# Patient Record
Sex: FEMALE | Race: WHITE | NOT HISPANIC OR LATINO | Employment: OTHER | URBAN - NONMETROPOLITAN AREA
[De-identification: names, ages, dates, MRNs, and addresses within clinical notes are randomized per-mention and may not be internally consistent; named-entity substitution may affect disease eponyms.]

---

## 2017-06-12 ENCOUNTER — LAB (OUTPATIENT)
Dept: LAB | Facility: HOSPITAL | Age: 72
End: 2017-06-12
Attending: PSYCHIATRY & NEUROLOGY

## 2017-06-12 ENCOUNTER — OFFICE VISIT (OUTPATIENT)
Dept: NEUROLOGY | Facility: CLINIC | Age: 72
End: 2017-06-12

## 2017-06-12 VITALS
OXYGEN SATURATION: 94 % | DIASTOLIC BLOOD PRESSURE: 64 MMHG | BODY MASS INDEX: 29.72 KG/M2 | HEART RATE: 95 BPM | HEIGHT: 65 IN | SYSTOLIC BLOOD PRESSURE: 118 MMHG | WEIGHT: 178.38 LBS

## 2017-06-12 DIAGNOSIS — Z13.1 ENCOUNTER FOR SCREENING FOR DIABETES MELLITUS: ICD-10-CM

## 2017-06-12 DIAGNOSIS — E08.29 DIABETES MELLITUS DUE TO UNDERLYING CONDITION WITH OTHER KIDNEY COMPLICATION: ICD-10-CM

## 2017-06-12 DIAGNOSIS — G62.9 PERIPHERAL POLYNEUROPATHY: ICD-10-CM

## 2017-06-12 DIAGNOSIS — G62.9 PERIPHERAL POLYNEUROPATHY: Primary | ICD-10-CM

## 2017-06-12 DIAGNOSIS — E08.8 DIABETES MELLITUS DUE TO UNDERLYING CONDITION WITH COMPLICATION, UNSPECIFIED LONG TERM INSULIN USE STATUS: ICD-10-CM

## 2017-06-12 PROCEDURE — 99204 OFFICE O/P NEW MOD 45 MIN: CPT | Performed by: PSYCHIATRY & NEUROLOGY

## 2017-06-12 RX ORDER — AMITRIPTYLINE HYDROCHLORIDE 25 MG/1
1 TABLET, FILM COATED ORAL DAILY
COMMUNITY
Start: 2015-07-22 | End: 2021-07-14

## 2017-06-12 RX ORDER — AMLODIPINE BESYLATE 2.5 MG/1
1 TABLET ORAL DAILY
COMMUNITY
Start: 2017-05-26 | End: 2022-09-22

## 2017-06-12 RX ORDER — CITALOPRAM 40 MG/1
1 TABLET ORAL DAILY
COMMUNITY
Start: 2015-07-22

## 2017-06-12 RX ORDER — CLOPIDOGREL BISULFATE 75 MG/1
75 TABLET ORAL DAILY
Status: ON HOLD | COMMUNITY
End: 2022-06-27 | Stop reason: SDUPTHER

## 2017-06-12 RX ORDER — HYDROXYCHLOROQUINE SULFATE 200 MG/1
200 TABLET, FILM COATED ORAL 2 TIMES DAILY
COMMUNITY
Start: 2015-07-22

## 2017-06-12 RX ORDER — MECLIZINE HCL 12.5 MG/1
1 TABLET ORAL EVERY 8 HOURS PRN
COMMUNITY
Start: 2015-07-22 | End: 2021-07-14

## 2017-06-12 RX ORDER — FERROUS SULFATE 325(65) MG
325 TABLET ORAL
COMMUNITY

## 2017-06-12 RX ORDER — OMEPRAZOLE 20 MG/1
CAPSULE, DELAYED RELEASE ORAL DAILY
COMMUNITY
Start: 2015-07-22 | End: 2021-07-14

## 2017-06-12 RX ORDER — ATORVASTATIN CALCIUM 80 MG/1
80 TABLET, FILM COATED ORAL DAILY
COMMUNITY

## 2017-06-12 RX ORDER — TIZANIDINE 4 MG/1
TABLET ORAL
COMMUNITY
Start: 2017-06-02 | End: 2017-06-12 | Stop reason: SDUPTHER

## 2017-06-12 RX ORDER — RAMIPRIL 5 MG/1
5 CAPSULE ORAL DAILY
COMMUNITY
Start: 2017-05-26

## 2017-06-12 RX ORDER — IBUPROFEN 200 MG
200 TABLET ORAL EVERY 6 HOURS PRN
COMMUNITY
End: 2021-07-14

## 2017-06-12 RX ORDER — TIZANIDINE HYDROCHLORIDE 4 MG/1
4 CAPSULE, GELATIN COATED ORAL DAILY
COMMUNITY
Start: 2015-07-22

## 2017-06-12 NOTE — PROGRESS NOTES
[facility] NEUROLOGY [place] CONSULTATION   History of Present Illness     Date: 6/12/2017    Patient Identification  Padmini Leonard is a 72 y.o. female.    Patient information was obtained from patient.  History/Exam limitations: none.    CONSULTATION requested by: Khoa Coburn MD      Chief Complaint   Balance Issues (Pt here to establish care. Pt states sx started when she had shingles in her ears)      History of Present Illness   Patient is a delightful 72-year-old referred to the [facility] neurology [place] for evaluation of gait difficulty over the last 3 years.  Patient reported that she has multiple episodes of falling especially when she is walking on an uneven surface.  Patient deny having any vertiginous sensation.  She deny any hip weakness.  Patient does complain of numbness in both feet.  Patient denies any diabetes in her past medical history.      PMH:   Past Medical History:   Diagnosis Date   • Anxiety    • Arthritis    • Bell palsy    • Lupus    • Shingles        Past Surgical History:   Past Surgical History:   Procedure Laterality Date   • APPENDECTOMY     • LAPAROSCOPIC TUBAL LIGATION         Family Hisotry:   Family History   Problem Relation Age of Onset   • Lung cancer Father    • Liver cancer Sister        Social History:   Social History     Social History   • Marital status:      Spouse name: N/A   • Number of children: N/A   • Years of education: N/A     Occupational History   • Not on file.     Social History Main Topics   • Smoking status: Current Every Day Smoker   • Smokeless tobacco: Never Used   • Alcohol use No   • Drug use: No   • Sexual activity: Not on file     Other Topics Concern   • Not on file     Social History Narrative   • No narrative on file       Medications:   Current Outpatient Prescriptions   Medication Sig Dispense Refill   • amitriptyline (ELAVIL) 25 MG tablet Take 1 tablet by mouth Daily.     • amLODIPine (NORVASC) 2.5 MG tablet 1 tablet  Daily.     • atorvastatin (LIPITOR) 80 MG tablet Take 80 mg by mouth Daily.     • carbidopa-levodopa (SINEMET)  MG per tablet Take 1 tablet by mouth Daily.     • citalopram (CeleXA) 40 MG tablet Take 1 tablet by mouth Daily.     • clopidogrel (PLAVIX) 75 MG tablet Take 75 mg by mouth Daily.     • ferrous sulfate 325 (65 FE) MG tablet Take 325 mg by mouth Daily With Breakfast.     • hydroxychloroquine (PLAQUENIL) 200 MG tablet Take  by mouth 2 (Two) Times a Day.     • ibuprofen (ADVIL,MOTRIN) 200 MG tablet Take 200 mg by mouth Every 6 (Six) Hours As Needed for Mild Pain (1-3).     • meclizine (ANTIVERT) 12.5 MG tablet Take 1 tablet by mouth Every 8 (Eight) Hours As Needed.     • omeprazole (priLOSEC) 20 MG capsule Take  by mouth Daily.     • ramipril (ALTACE) 5 MG capsule      • TiZANidine (ZANAFLEX) 4 MG capsule Take  by mouth Daily.       No current facility-administered medications for this visit.        Allergy:   Allergies   Allergen Reactions   • Penicillins    • Sulfa Antibiotics        Review of Systems:  Review of Systems   Constitutional: Negative for chills and fever.   HENT: Negative for congestion, ear pain, hearing loss, rhinorrhea and sore throat.    Eyes: Negative for pain, discharge and redness.   Respiratory: Negative for cough, shortness of breath, wheezing and stridor.    Cardiovascular: Negative for chest pain, palpitations and leg swelling.   Gastrointestinal: Negative for abdominal pain, constipation, nausea and vomiting.   Endocrine: Negative for cold intolerance, heat intolerance and polyphagia.   Genitourinary: Negative for dysuria, flank pain, frequency and urgency.   Musculoskeletal: Positive for gait problem. Negative for joint swelling, myalgias, neck pain and neck stiffness.   Skin: Negative for pallor, rash and wound.   Allergic/Immunologic: Negative for environmental allergies.   Neurological: Positive for numbness. Negative for dizziness, tremors, seizures, syncope, facial  "asymmetry, speech difficulty, weakness, light-headedness and headaches.   Hematological: Negative for adenopathy.   Psychiatric/Behavioral: Negative for confusion and hallucinations. The patient is not nervous/anxious.        Physical Exam     Vitals:    06/12/17 1414   BP: 118/64   Pulse: 95   SpO2: 94%   Weight: 178 lb 6 oz (80.9 kg)   Height: 64.5\" (163.8 cm)     GENERAL: Patient is pleasant, cooperative, appears to be stated age.  Body habitus is endomorphic.  SKIN AND EXTREMITIES:  No skin rashes or lesions are noted.  No cyanosis, clubbing or edema of the extremities.    HEAD:  Head is normocephalic and atraumatic.    NECK: Neck are non-tender without thyromegaly or adenopathy.  Carotic upstrokes are 1+/4.  No cranial or cervical bruits.  The neck is supple with a full range of motion.   ENT: palate elevate symmetrically, no evidence of high arch palate, tongue midline erythema in posterior pharynx, Mallampati Classification Class III   CARDIOVASCULAR:  Regular rate and rhythm with normal S1 and S2 without rub or gallop.  RESPIRATORY:  Clear to auscultation without wheezes or crackle   ABDOMEN:  Soft and non-tender, positive bowel sound without hepatosplenomegaly  BACK:  Back is straight without midline defect.    PSYCH:  Higher cortical function/mental status:  The patient is alert.  She is oriented x3 to time, place and person.  Recent and the remote memory appear normal.  The patient has a good fund of knowledge.  There is no visual or auditory hallucination or suicidal or homicidal ideation.  SPEECH:There is no gross evidence of aphasia, dysarthria or agnosia.      CRANIAL NERVES:  Pupils are 4mm, equal round reactive to light, reacting briskly to 2mm without afferent pupillary defect.  Visual fields are intact to confrontation testing.  Fundoscopic examination reveals sharp disk margins with normal vasculature.  No papilledema, hemorrhages or exudates.  Extraocular movements are full and smooth with " normal pursuits and saccades.  No nystagmus noted.  The face is symmetric. palate elevate symmetrically, Tongue midline, positive gag reflex. The remainder of the cranial nerves are intact and symmetrical.    MOTOR: Strength is 5/5 throughout with normal tone and bulk with the following exceptions, 4/5 intrinsic muscles of the hands and feet.  No involuntary movements noted.    Deep Tendon Reflexes: 2+ throughout except in her left ankle itches 0 over 4   SENSATION: Stocking glove sensory deficits to temperature sensation in both feet up to her knee bilaterally   COORDINATION AND GAIT: He should ambulate with a cautious gait and was quite unsteady on her left MUSCULOSKELETAL: Range of motion normal, no clubbing, cyanosis, or edema.  No joint swelling.            Studies: I have personally reviewed the following and discussed with the patient.  Results for orders placed or performed during the hospital encounter of 06/29/15   CBC and Differential   Result Value Ref Range    WBC 6.8 4.8 - 10.8 THOUS    RBC 4.14 (L) 4.20 - 5.40 m/uL    Hemoglobin 10.1 (L) 12.0 - 16.0 g/dL    Hematocrit 36 (L) 37 - 47 %    MCV 87.9 81.0 - 99.0 fL    MCH 24.4 (L) 27.0 - 31.0 uug    MCHC 27.7 (L) 30.0 - 37.0 g/dL    RDW 19.8 (H) 11.5 - 14.5 %    Platelets 268 130 - 400 THOUS    Neutrophil Rel % 78.2 37.0 - 80.0 %    Lymphocyte Rel % 11.8 10.0 - 50.0 %    Monocyte Rel % 5.8 0.0 - 12.0 %    Eosinophil Rel % 2.6 0.0 - 7.0 %    Basophil Rel % 1.30 0.00 - 2.50 %    Immature Granulocyte Rel % 0.30 0.00 - 2.50 %    Neutrophils Absolute 5.34 2.00 - 6.90 THOUS    Lymphocytes Absolute 0.81 0.60 - 3.40 THOUS    Monocytes Absolute 0.40 0.00 - 0.90 THOUS    Eosinophils Absolute 0.18 0.00 - 0.70 THOUS    Basophils Absolute 0.09 0.00 - 0.20 THOUS    Abs Imm Gran 0.02 0.00 - 0.60 THOUS   Protime-INR   Result Value Ref Range    Patient On Coumadin? N     Protime 10.8 9.3 - 12.1 SEC    INR 1.0 0.9 - 1.1   APTT   Result Value Ref Range    Patient On  Heparin? N     PTT 29 25 - 36 SEC   Comprehensive metabolic panel   Result Value Ref Range    Sodium 139 137 - 145 mmol/L    Potassium 5.5 (H) 3.5 - 5.1 mmol/L    Chloride 109 (H) 98 - 107 mmol/L    CO2 23 (L) 26 - 30 mmol/L    Anion Gap 13 10 - 20 mmol/L    BUN 18 7 - 20 mg/dL    Creatinine 0.7 0.6 - 1.3 mg/dL    BUN/Creatinine Ratio 23.9 (H) 7.1 - 23.5    eGFR >60 mL/min    Glucose 84 74 - 98 mg/dL    Calcium 9.1 8.4 - 10.2 mg/dL    Total Bilirubin 0.7 0.2 - 1.3 mg/dL    AST (SGOT) 56 (H) 15 - 46 U/L    ALT (SGPT) 35 13 - 69 U/L    Total Protein 7.1 6.3 - 8.2 g/dL    Albumin 4.0 3.5 - 5.0 g/dL    A/G Ratio 1.3 1.0 - 2.0    Alkaline Phosphatase 93 38 - 126 U/L       Review of Imaging: I have personally reviewed the following images and discussed with the patient.  No results found.      Records Reviewed: I have personally reviewed her previous medical record.    Padmini was seen today for balance issues.    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  Have counseled patient extensively today with regard to Peripheral Neuropathy as follow  I have counseled patient extensively on peripheral neuropathy.  The prevalence of peripheral neuropathy in a general practice is 8 percent in persons 55 years and older.    Peripheral neuropathy can be caused by a variety of systemic diseases, toxic exposures, medications, infections, and hereditary disorder. The most common treatable causes are diabetes, hypothyroidism,  and nutritional deficiencies.  When a patient presents with symptoms of distal numbness, tingling and pain, or weakness, the first step is to determine whether the symptoms are the result of peripheral neuropathy or of other part of  the CNS, such as nerve roots, or nerve plexus. CNS lesions may be associated with other features, such as speech difficulty, double vision, ataxia, cranial nerve involvement, or, in cases of myelopathy, impairment of bowel and bladder functions. Deep tendon reflexes are usually brisk, and muscle tone is spastic. Lesions of the peripheral nerve roots are typically asymmetric, follow a dermatomal pattern of sensory symptoms, and may have associated neck and low back pain. Lesions of the plexus are asymmetric with sensorimotor involvement of multiple nerves in one extremity. The presence of neuropathic symptoms, decreased ankle reflexes, and decreased distal sensations, regardless of distal muscle weakness and atrophy, makes the diagnosis of peripheral neuropathy likely.   The next step is to find the etiology and exclude potentially treatable causes, such as acquired toxic, nutritional, inflammatory, or immune-mediated demyelinating disorders. The neuropathies must be further characterized by onset and chronicity of symptoms, the pattern and extent of involvement, and the type of nerve fibers involved (i.e., sensory, motor, or autonomic).   The evaluation of a patient with peripheral neuropathy starts with simple blood tests, including a complete blood count, comprehensive metabolic profile, and measurement of erythrocyte sedimentation rate and fasting blood glucose, vitamin B12, and thyroid-stimulating hormone levels. Electrodiagnostic studies are recommended if the diagnosis remains unclear after initial diagnostic testing and a careful history and physical examination.  Treatment of peripheral neuropathy has two goals:  first, is to eliminate the offending agents, such as toxins or  medications; correcting a nutritional deficiency; or treating the underlying disease (e.g., corticosteroid therapy for immune-mediated neuropathy). These steps are important to halt the progression of neuropathy, and they may improve symptoms. Second, is to help patients control troublesome symptoms of peripheral neuropathy, such as severe numbness and pain, as well as to alleviate disability resulting from weakness. Several pharmacologic options exist to treat neuropathic pain, including gabapentin [Neurontin], topiramate [Topamax], carbamazepine [Tegretol], pregabalin [Lyrica]) and antidepressants (e.g., amitriptyline). Topical patches and sprays containing lidocaine (Lidoderm) or capsaicin (Zostrix) also may relieve pain in some patients.Other supportive measures, such as foot care, weight reduction, and shoe selection, may also be helpful.   2.  We'll check a baseline blood work for peripheral neuropathy workup  3.  Advised patient to ambulate with a hand-held can to ensure safety    This Document is signed by Gabriel Gibbs MD, FAAN, FAASM June 12, 20178:51 PM

## 2017-06-14 ENCOUNTER — LAB (OUTPATIENT)
Dept: LAB | Facility: HOSPITAL | Age: 72
End: 2017-06-14
Attending: PSYCHIATRY & NEUROLOGY

## 2017-06-14 DIAGNOSIS — Z13.1 ENCOUNTER FOR SCREENING FOR DIABETES MELLITUS: ICD-10-CM

## 2017-06-14 DIAGNOSIS — G62.9 PERIPHERAL POLYNEUROPATHY: ICD-10-CM

## 2017-06-14 LAB
FOLATE SERPL-MCNC: 12.9 NG/ML
GLUCOSE 1H P 100 G GLC PO SERPL-MCNC: 110 MG/DL (ref 74–110)
GLUCOSE 2H P 100 G GLC PO SERPL-MCNC: 77 MG/DL
GLUCOSE 3H P 100 G GLC PO SERPL-MCNC: 97 MG/DL
GLUCOSE P FAST SERPL-MCNC: 90 MG/DL (ref 74–98)
TSH SERPL DL<=0.05 MIU/L-ACNC: 6.09 MIU/ML (ref 0.47–4.68)
VIT B12 BLD-MCNC: 647 PG/ML (ref 239–931)

## 2017-06-14 PROCEDURE — 84165 PROTEIN E-PHORESIS SERUM: CPT | Performed by: PSYCHIATRY & NEUROLOGY

## 2017-06-14 PROCEDURE — 82951 GLUCOSE TOLERANCE TEST (GTT): CPT | Performed by: PSYCHIATRY & NEUROLOGY

## 2017-06-14 PROCEDURE — 84155 ASSAY OF PROTEIN SERUM: CPT | Performed by: PSYCHIATRY & NEUROLOGY

## 2017-06-14 PROCEDURE — 86235 NUCLEAR ANTIGEN ANTIBODY: CPT | Performed by: PSYCHIATRY & NEUROLOGY

## 2017-06-14 PROCEDURE — 36415 COLL VENOUS BLD VENIPUNCTURE: CPT

## 2017-06-14 PROCEDURE — 82607 VITAMIN B-12: CPT | Performed by: PSYCHIATRY & NEUROLOGY

## 2017-06-14 PROCEDURE — 82746 ASSAY OF FOLIC ACID SERUM: CPT | Performed by: PSYCHIATRY & NEUROLOGY

## 2017-06-14 PROCEDURE — 86334 IMMUNOFIX E-PHORESIS SERUM: CPT | Performed by: PSYCHIATRY & NEUROLOGY

## 2017-06-14 PROCEDURE — 84443 ASSAY THYROID STIM HORMONE: CPT | Performed by: PSYCHIATRY & NEUROLOGY

## 2017-06-14 PROCEDURE — 82952 GTT-ADDED SAMPLES: CPT | Performed by: PSYCHIATRY & NEUROLOGY

## 2017-06-15 LAB
ALBUMIN SERPL-MCNC: 3.5 G/DL (ref 2.9–4.4)
ALBUMIN/GLOB SERPL: 1.2 {RATIO} (ref 0.7–1.7)
ALPHA1 GLOB FLD ELPH-MCNC: 0.3 G/DL (ref 0–0.4)
ALPHA2 GLOB SERPL ELPH-MCNC: 1 G/DL (ref 0.4–1)
B-GLOBULIN SERPL ELPH-MCNC: 1 G/DL (ref 0.7–1.3)
ENA SS-A AB SER-ACNC: >8 AI (ref 0–0.9)
ENA SS-B AB SER-ACNC: 0.2 AI (ref 0–0.9)
GAMMA GLOB SERPL ELPH-MCNC: 0.7 G/DL (ref 0.4–1.8)
GLOBULIN SER CALC-MCNC: 3 G/DL (ref 2.2–3.9)
IGA SERPL-MCNC: 174 MG/DL (ref 64–422)
IGG SERPL-MCNC: 662 MG/DL (ref 700–1600)
IGM SERPL-MCNC: 31 MG/DL (ref 26–217)
INTERPRETATION SERPL IEP-IMP: ABNORMAL
Lab: ABNORMAL
M-SPIKE: ABNORMAL G/DL
PROT SERPL-MCNC: 6.5 G/DL (ref 6–8.5)

## 2017-07-31 ENCOUNTER — TRANSCRIBE ORDERS (OUTPATIENT)
Dept: MAMMOGRAPHY | Facility: HOSPITAL | Age: 72
End: 2017-07-31

## 2017-07-31 DIAGNOSIS — M81.0 OSTEOPOROSIS: ICD-10-CM

## 2017-07-31 DIAGNOSIS — Z13.9 SCREENING: Primary | ICD-10-CM

## 2017-08-17 ENCOUNTER — HOSPITAL ENCOUNTER (OUTPATIENT)
Dept: MAMMOGRAPHY | Facility: HOSPITAL | Age: 72
Discharge: HOME | End: 2017-08-17
Admitting: INTERNAL MEDICINE

## 2017-08-17 ENCOUNTER — APPOINTMENT (OUTPATIENT)
Dept: BONE DENSITY | Facility: HOSPITAL | Age: 72
End: 2017-08-17

## 2017-08-17 DIAGNOSIS — Z13.9 SCREENING: ICD-10-CM

## 2017-08-17 DIAGNOSIS — M81.0 OSTEOPOROSIS: ICD-10-CM

## 2017-08-17 PROCEDURE — 77063 BREAST TOMOSYNTHESIS BI: CPT

## 2017-08-17 PROCEDURE — G0202 SCR MAMMO BI INCL CAD: HCPCS

## 2017-08-17 PROCEDURE — 77080 DXA BONE DENSITY AXIAL: CPT

## 2017-09-08 PROBLEM — M06.9 RA (RHEUMATOID ARTHRITIS) (HCC): Status: ACTIVE | Noted: 2017-09-08

## 2017-09-08 PROBLEM — M79.7 FIBROMYALGIA: Status: ACTIVE | Noted: 2017-09-08

## 2017-09-08 PROBLEM — Z72.0 TOBACCO ABUSE: Status: ACTIVE | Noted: 2017-09-08

## 2017-09-08 PROBLEM — M32.9 SLE (SYSTEMIC LUPUS ERYTHEMATOSUS): Status: ACTIVE | Noted: 2017-09-08

## 2017-09-08 PROBLEM — F41.9 ANXIETY: Status: ACTIVE | Noted: 2017-09-08

## 2017-09-08 PROBLEM — D64.9 ANEMIA: Status: ACTIVE | Noted: 2017-09-08

## 2017-09-08 PROBLEM — K63.3 COLON ULCER: Status: ACTIVE | Noted: 2017-09-08

## 2017-09-08 PROBLEM — G25.81 RESTLESS LEGS SYNDROME (RLS): Status: ACTIVE | Noted: 2017-09-08

## 2017-09-08 PROBLEM — M19.90 OSTEOARTHRITIS: Status: ACTIVE | Noted: 2017-09-08

## 2017-09-08 PROBLEM — E05.90 HYPERTHYROIDISM: Status: ACTIVE | Noted: 2017-09-08

## 2017-09-08 PROBLEM — K31.819 GASTRIC AND DUODENAL ANGIODYSPLASIA: Status: ACTIVE | Noted: 2017-09-08

## 2017-09-08 PROBLEM — R12 HEARTBURN: Status: ACTIVE | Noted: 2017-09-08

## 2017-09-12 ENCOUNTER — OFFICE VISIT (OUTPATIENT)
Dept: NEUROLOGY | Facility: CLINIC | Age: 72
End: 2017-09-12

## 2017-09-12 VITALS
BODY MASS INDEX: 29.66 KG/M2 | OXYGEN SATURATION: 94 % | WEIGHT: 178 LBS | SYSTOLIC BLOOD PRESSURE: 128 MMHG | HEIGHT: 65 IN | HEART RATE: 91 BPM | DIASTOLIC BLOOD PRESSURE: 76 MMHG

## 2017-09-12 DIAGNOSIS — M35.00 SJOGREN'S SYNDROME (HCC): ICD-10-CM

## 2017-09-12 DIAGNOSIS — G62.9 POLYNEUROPATHY: Primary | ICD-10-CM

## 2017-09-12 PROCEDURE — 99214 OFFICE O/P EST MOD 30 MIN: CPT | Performed by: PSYCHIATRY & NEUROLOGY

## 2017-09-12 RX ORDER — LEVOTHYROXINE SODIUM 0.05 MG/1
1 TABLET ORAL DAILY
Refills: 2 | COMMUNITY
Start: 2017-06-22 | End: 2021-07-14

## 2017-09-12 NOTE — PROGRESS NOTES
[facility] NEUROLOGY [place] PROGRESS NOTE  History of Present Illness     Date: 9/12/2017    Patient Identification  Padmini Leonard is a 72 y.o. female.    Patient information was obtained from patient.  History/Exam limitations: none.    Original consultation requested by:       Chief Complaint   Peripheral Neuropathy (Pt in office for 3 month follow up ) and Balance Issues (sx have not improved )      History of Present Illness   Patient is a pleasant 72-year-old referred to [facility] neurology for evaluation of gait difficulty over the last 3 years.  She is having more difficulty with ambulation in on even surface .  Patient is extensive workup in last visit in my office.  She was found to have peripheral neuropathy which lead to frequent falling.  Her blood work is also significant for markedly elevated Sjogren antibody, namely anti RO which is > 8 and normal should be 0.  We have discussed about referring this patient to our rheumatology colleague for further evaluation.      PMH:   Past Medical History:   Diagnosis Date   • Anxiety    • Arthritis    • Backache     H/o   • Bell palsy    • H/O renal calculi    • History of blood transfusion    • Ischemic ulcer of left foot     · Ischemic left foot. 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 Unfortunately  the patient continues to smoke. Long discussion was undertaken with the patient. She had accepteda request ti give a serious consideration to quit smoking   • Lupus    • Peptic ulcer     H/o   • Shingles    • Vertigo     H/o       Past Surgical History:   Past Surgical History:   Procedure Laterality Date   • APPENDECTOMY     • COLONOSCOPY      screening   • ENDOSCOPY      with biopsy   • EYE SURGERY      Cataract   • LAPAROSCOPIC TUBAL LIGATION     • TUBAL ABDOMINAL LIGATION         Family Hisotry:   Family History   Problem Relation Age of Onset   • Lung cancer Father    • Liver cancer Sister        Social History:   Social History      Social History   • Marital status:      Spouse name: N/A   • Number of children: N/A   • Years of education: N/A     Occupational History   • Not on file.     Social History Main Topics   • Smoking status: Current Every Day Smoker   • Smokeless tobacco: Never Used   • Alcohol use No   • Drug use: No   • Sexual activity: Not on file     Other Topics Concern   • Not on file     Social History Narrative       Medications:   Current Outpatient Prescriptions   Medication Sig Dispense Refill   • amitriptyline (ELAVIL) 25 MG tablet Take 1 tablet by mouth Daily.     • amLODIPine (NORVASC) 2.5 MG tablet 1 tablet Daily.     • atorvastatin (LIPITOR) 80 MG tablet Take 80 mg by mouth Daily.     • carbidopa-levodopa (SINEMET)  MG per tablet Take 1 tablet by mouth Daily.     • citalopram (CeleXA) 40 MG tablet Take 1 tablet by mouth Daily.     • clopidogrel (PLAVIX) 75 MG tablet Take 75 mg by mouth Daily.     • ferrous sulfate 325 (65 FE) MG tablet Take 325 mg by mouth Daily With Breakfast.     • hydroxychloroquine (PLAQUENIL) 200 MG tablet Take  by mouth 2 (Two) Times a Day.     • ibuprofen (ADVIL,MOTRIN) 200 MG tablet Take 200 mg by mouth Every 6 (Six) Hours As Needed for Mild Pain (1-3).     • levothyroxine (SYNTHROID, LEVOTHROID) 50 MCG tablet 1 tablet Daily.    2   • meclizine (ANTIVERT) 12.5 MG tablet Take 1 tablet by mouth Every 8 (Eight) Hours As Needed.     • omeprazole (priLOSEC) 20 MG capsule Take  by mouth Daily.     • ramipril (ALTACE) 5 MG capsule      • TiZANidine (ZANAFLEX) 4 MG capsule Take  by mouth Daily.       No current facility-administered medications for this visit.        Allergy:   Allergies   Allergen Reactions   • Penicillins    • Sulfa Antibiotics        Review of Systems:  Review of Systems   Constitutional: Negative for chills and fever.   HENT: Negative for congestion, ear pain, hearing loss, rhinorrhea and sore throat.    Eyes: Negative for pain, discharge and redness.  "  Respiratory: Negative for cough, shortness of breath, wheezing and stridor.    Cardiovascular: Negative for chest pain, palpitations and leg swelling.   Gastrointestinal: Negative for abdominal pain, constipation, nausea and vomiting.   Endocrine: Negative for cold intolerance, heat intolerance and polyphagia.   Genitourinary: Negative for dysuria, flank pain, frequency and urgency.   Musculoskeletal: Positive for gait problem. Negative for joint swelling, myalgias, neck pain and neck stiffness.   Skin: Negative for pallor, rash and wound.   Allergic/Immunologic: Negative for environmental allergies.   Neurological: Positive for weakness and numbness. Negative for dizziness, tremors, seizures, syncope, facial asymmetry, speech difficulty, light-headedness and headaches.   Hematological: Negative for adenopathy.   Psychiatric/Behavioral: Negative for confusion and hallucinations. The patient is not nervous/anxious.        Physical Exam     Vitals:    09/12/17 1056   BP: 128/76   Pulse: 91   SpO2: 94%   Weight: 178 lb (80.7 kg)   Height: 64.5\" (163.8 cm)     GENERAL: Patient is pleasant, cooperative, appears to be stated age.  Body habitus is endomorphic.  SKIN AND EXTREMITIES:  No skin rashes or lesions are noted.  No cyanosis, clubbing or edema of the extremities.    HEAD:  Head is normocephalic and atraumatic.    NECK: Neck are non-tender without thyromegaly or adenopathy.  Carotic upstrokes are 1+/4.  No cranial or cervical bruits.  The neck is supple with a full range of motion.   ENT: palate elevate symmetrically, no evidence of high arch palate, tongue midline erythema in posterior pharynx, Mallampati Classification Class III   CARDIOVASCULAR:  Regular rate and rhythm with normal S1 and S2 without rub or gallop.  RESPIRATORY:  Clear to auscultation without wheezes or crackle   ABDOMEN:  Soft and non-tender, positive bowel sound without hepatosplenomegaly  BACK:  Back is straight without midline defect.  "   PSYCH:  Higher cortical function/mental status:  The patient is alert.  She is oriented x3 to time, place and person.  Recent and the remote memory appear normal.  The patient has a good fund of knowledge.  There is no visual or auditory hallucination or suicidal or homicidal ideation.  SPEECH:There is no gross evidence of aphasia, dysarthria or agnosia.      CRANIAL NERVES:  Pupils are 4mm, equal round reactive to light, reacting briskly to 2mm without afferent pupillary defect.  Visual fields are intact to confrontation testing.  Fundoscopic examination reveals sharp disk margins with normal vasculature.  No papilledema, hemorrhages or exudates.  Extraocular movements are full and smooth with normal pursuits and saccades.  No nystagmus noted.  The face is symmetric. palate elevate symmetrically, Tongue midline, positive gag reflex. The remainder of the cranial nerves are intact and symmetrical.    MOTOR: Strength is 5/5 throughout with normal tone and bulk with the following exceptions, 4/5 intrinsic muscles of the hands and feet.  No involuntary movements noted.    Deep Tendon Reflexes: 0 throughout.    SENSATION: Dokken glove sensory deficit   Coordination:  The patient normally performs finger-nose-finger, heel-to-knee-to-shin and rapid alternating movements in symmetrical fashion.    COORDINATION AND GAIT:  Patient is unable to perform tandem walk she ambulated with a wide-based gait   MUSCULOSKELETAL: Range of motion normal, no clubbing, cyanosis, or edema.  No joint swelling.              Records Reviewed: I have personally reviewed her previous medical record.    Padmini was seen today for peripheral neuropathy and balance issues.    Diagnoses and all orders for this visit:    Polyneuropathy  -     Ambulatory Referral to Rheumatology    Sjogren's syndrome      Treatments:    1.  Counseled patient extensively on safety in peripheral neuropathy patient  2.  We'll refer this patient to our rheumatology  colleague to further evaluate her markedly elevated Anti- RO  3.  Counseled patient on peripheral neuropathy as follow  I have counseled patient extensively on peripheral neuropathy.  The prevalence of peripheral neuropathy in a general practice is 8 percent in persons 55 years and older.    Peripheral neuropathy can be caused by a variety of systemic diseases, toxic exposures, medications, infections, and hereditary disorder. The most common treatable causes are diabetes, hypothyroidism, and nutritional deficiencies.  When a patient presents with symptoms of distal numbness, tingling and pain, or weakness, the first step is to determine whether the symptoms are the result of peripheral neuropathy or of other part of  the CNS, such as nerve roots, or nerve plexus. CNS lesions may be associated with other features, such as speech difficulty, double vision, ataxia, cranial nerve involvement, or, in cases of myelopathy, impairment of bowel and bladder functions. Deep tendon reflexes are usually brisk, and muscle tone is spastic. Lesions of the peripheral nerve roots are typically asymmetric, follow a dermatomal pattern of sensory symptoms, and may have associated neck and low back pain. Lesions of the plexus are asymmetric with sensorimotor involvement of multiple nerves in one extremity. The presence of neuropathic symptoms, decreased ankle reflexes, and decreased distal sensations, regardless of distal muscle weakness and atrophy, makes the diagnosis of peripheral neuropathy likely.   The next step is to find the etiology and exclude potentially treatable causes, such as acquired toxic, nutritional, inflammatory, or immune-mediated demyelinating disorders. The neuropathies must be further characterized by onset and chronicity of symptoms, the pattern and extent of involvement, and the type of nerve fibers involved (i.e., sensory, motor, or autonomic).   The evaluation of a patient with peripheral neuropathy starts  with simple blood tests, including a complete blood count, comprehensive metabolic profile, and measurement of erythrocyte sedimentation rate and fasting blood glucose, vitamin B12, and thyroid-stimulating hormone levels. Electrodiagnostic studies are recommended if the diagnosis remains unclear after initial diagnostic testing and a careful history and physical examination.  Treatment of peripheral neuropathy has two goals:  first, is to eliminate the offending agents, such as toxins or medications; correcting a nutritional deficiency; or treating the underlying disease (e.g., corticosteroid therapy for immune-mediated neuropathy). These steps are important to halt the progression of neuropathy, and they may improve symptoms. Second, is to help patients control troublesome symptoms of peripheral neuropathy, such as severe numbness and pain, as well as to alleviate disability resulting from weakness. Several pharmacologic options exist to treat neuropathic pain, including gabapentin [Neurontin], topiramate [Topamax], carbamazepine [Tegretol], pregabalin [Lyrica]) and antidepressants (e.g., amitriptyline). Topical patches and sprays containing lidocaine (Lidoderm) or capsaicin (Zostrix) also may relieve pain in some patients.Other supportive measures, such as foot care, weight reduction, and shoe selection, may also be helpful.  This Document is signed by Gabriel Gibbs MD, FAAN, FAASM   September 12, 20177:50 PM

## 2018-01-10 ENCOUNTER — OFFICE VISIT (OUTPATIENT)
Dept: FAMILY MEDICINE CLINIC | Facility: CLINIC | Age: 73
End: 2018-01-10

## 2018-01-10 VITALS
BODY MASS INDEX: 30.02 KG/M2 | TEMPERATURE: 99 F | OXYGEN SATURATION: 97 % | RESPIRATION RATE: 14 BRPM | HEIGHT: 64.5 IN | WEIGHT: 178 LBS | SYSTOLIC BLOOD PRESSURE: 114 MMHG | HEART RATE: 101 BPM | DIASTOLIC BLOOD PRESSURE: 60 MMHG

## 2018-01-10 DIAGNOSIS — J01.10 ACUTE NON-RECURRENT FRONTAL SINUSITIS: Primary | ICD-10-CM

## 2018-01-10 DIAGNOSIS — J20.9 ACUTE BRONCHITIS, UNSPECIFIED ORGANISM: ICD-10-CM

## 2018-01-10 PROCEDURE — 99202 OFFICE O/P NEW SF 15 MIN: CPT | Performed by: PHYSICIAN ASSISTANT

## 2018-01-10 RX ORDER — BENZONATATE 200 MG/1
200 CAPSULE ORAL 3 TIMES DAILY PRN
Qty: 20 CAPSULE | Refills: 0 | Status: SHIPPED | OUTPATIENT
Start: 2018-01-10 | End: 2018-03-26 | Stop reason: ALTCHOICE

## 2018-01-10 RX ORDER — HYDROXYCHLOROQUINE SULFATE 200 MG/1
200 TABLET, FILM COATED ORAL DAILY
COMMUNITY
End: 2018-10-03

## 2018-01-10 RX ORDER — CEFDINIR 300 MG/1
300 CAPSULE ORAL 2 TIMES DAILY
Qty: 20 CAPSULE | Refills: 0 | Status: SHIPPED | OUTPATIENT
Start: 2018-01-10 | End: 2018-01-20

## 2018-01-10 NOTE — PROGRESS NOTES
Patient presents with:  Cough: nasal congestion, ST, dry cough, slight headache x 2 week,   no fever or body aches. has COPD    HPI:   Hilda Arce is a 74year old female who presents for sinus congestion and cough for  2  weeks.  Cough started gradual CHEST: no chest pains, no palpitations, no orthopnea.   LUNGS:  See HPI  CARDIOVASCULAR: denies chest pain on exertion  GI: no nausea or abdominal pain  NEURO: +sinus headaches, no numbness/tingling to face     EXAM:   /60   Pulse 101   Temp 98.9 °F ( Patient Instructions     Please follow up with your PCP if no improvement within 5-7 days. Go directly to the ER for any acute worsening of symptoms. If you smoke, stop smoking.   Push oral fluids to help loosen up chest mucous and move it out of your bod A second infection, this time due to bacteria, may then occur. And airways irritated by allergens or smoke are more likely to get infected.        Inflamed airway: Inflammation and extra mucus narrow the airway, causing shortness of breath.       Impaired c Most cases of bronchitis are caused by cold or flu viruses. They don’t need antibiotics to treat them, even if your mucus is thick and green or yellow.  Antibiotics don’t treat viral illness and antibiotics have not been shown to have any benefit in cases o © 2774-0988 [employer] [address] [facility] [place], [facility] [place] [address] [place]. All rights reserved. This information is not intended as a substitute for professional medical care. Always follow your healthcare professional's instructions.

## 2018-01-10 NOTE — PATIENT INSTRUCTIONS
Please follow up with your PCP if no improvement within 5-7 days. Go directly to the ER for any acute worsening of symptoms. If you smoke, stop smoking. Push oral fluids to help loosen up chest mucous and move it out of your body.   Use a cold mist humid A second infection, this time due to bacteria, may then occur. And airways irritated by allergens or smoke are more likely to get infected.        Inflamed airway: Inflammation and extra mucus narrow the airway, causing shortness of breath.       Impaired c Most cases of bronchitis are caused by cold or flu viruses. They don’t need antibiotics to treat them, even if your mucus is thick and green or yellow.  Antibiotics don’t treat viral illness and antibiotics have not been shown to have any benefit in cases o © 0237-7804 [employer] [address] [facility], [facility] [place]. All rights reserved. This information is not intended as a substitute for professional medical care. Always follow your healthcare professional's instructions.

## 2018-03-26 PROBLEM — M35.01 SJOGREN'S SYNDROME WITH KERATOCONJUNCTIVITIS SICCA (HCC): Status: ACTIVE | Noted: 2018-03-26

## 2018-03-26 PROBLEM — Z87.11 HX OF GASTRIC ULCER: Status: ACTIVE | Noted: 2017-09-08

## 2018-03-26 PROBLEM — I73.9 PVD (PERIPHERAL VASCULAR DISEASE) (HCC): Status: ACTIVE | Noted: 2017-03-26

## 2018-03-26 PROBLEM — M79.7 FIBROMYALGIA: Status: ACTIVE | Noted: 2017-09-08

## 2018-03-26 PROBLEM — M19.90 OSTEOARTHROSIS: Status: ACTIVE | Noted: 2017-09-08

## 2018-03-26 PROBLEM — R12 HEARTBURN: Status: ACTIVE | Noted: 2017-09-08

## 2018-03-26 PROBLEM — K63.3 COLON ULCER: Status: ACTIVE | Noted: 2017-09-08

## 2018-03-26 PROBLEM — D64.9 ANEMIA: Status: ACTIVE | Noted: 2017-09-08

## 2018-03-26 PROBLEM — F17.219 CIGARETTE NICOTINE DEPENDENCE WITH NICOTINE-INDUCED DISORDER: Status: ACTIVE | Noted: 2017-09-08

## 2018-03-26 PROBLEM — G20 PARKINSONIAN TREMOR (HCC): Status: ACTIVE | Noted: 2018-03-26

## 2018-03-26 PROBLEM — E89.0 POSTABLATIVE HYPOTHYROIDISM: Status: ACTIVE | Noted: 2018-03-26

## 2018-03-26 PROBLEM — M06.9 RA (RHEUMATOID ARTHRITIS) (HCC): Status: ACTIVE | Noted: 2017-09-08

## 2018-03-26 PROBLEM — K31.819 GASTRIC AND DUODENAL ANGIODYSPLASIA: Status: ACTIVE | Noted: 2017-09-08

## 2018-03-26 PROBLEM — M47.812 ARTHROPATHY OF CERVICAL FACET JOINT: Status: ACTIVE | Noted: 2018-03-26

## 2018-03-26 PROBLEM — M32.9 SLE (SYSTEMIC LUPUS ERYTHEMATOSUS) (HCC): Status: ACTIVE | Noted: 2017-09-08

## 2018-03-26 PROBLEM — I73.9 PVD (PERIPHERAL VASCULAR DISEASE) (HCC): Status: ACTIVE | Noted: 2018-03-26

## 2018-03-26 PROBLEM — Z72.0 TOBACCO ABUSE: Status: ACTIVE | Noted: 2017-09-08

## 2018-03-26 PROBLEM — L93.2 CUTANEOUS LUPUS ERYTHEMATOSUS: Status: ACTIVE | Noted: 2017-09-08

## 2018-03-26 PROBLEM — M48.02 CERVICAL STENOSIS OF SPINE: Status: ACTIVE | Noted: 2018-03-26

## 2018-04-10 PROCEDURE — 82607 VITAMIN B-12: CPT | Performed by: INTERNAL MEDICINE

## 2018-04-10 PROCEDURE — 81001 URINALYSIS AUTO W/SCOPE: CPT | Performed by: INTERNAL MEDICINE

## 2018-04-10 PROCEDURE — 86141 C-REACTIVE PROTEIN HS: CPT | Performed by: INTERNAL MEDICINE

## 2018-04-11 PROBLEM — E55.9 VITAMIN D DEFICIENCY: Status: ACTIVE | Noted: 2018-04-11

## 2018-04-11 PROBLEM — N28.9 MILD RENAL INSUFFICIENCY: Status: ACTIVE | Noted: 2018-04-11

## 2018-04-11 PROBLEM — R74.8 ELEVATED ALKALINE PHOSPHATASE LEVEL: Status: ACTIVE | Noted: 2018-04-11

## 2018-04-25 PROBLEM — E11.42 TYPE 2 DIABETES MELLITUS WITH PERIPHERAL NEUROPATHY (HCC): Status: ACTIVE | Noted: 2018-04-25

## 2018-05-24 PROBLEM — I70.209 ATHEROSCLEROSIS OF ARTERIES OF EXTREMITIES (HCC): Status: ACTIVE | Noted: 2018-05-24

## 2018-05-24 PROBLEM — Z95.828 S/P INSERTION OF ILIAC ARTERY STENT: Status: ACTIVE | Noted: 2018-05-24

## 2018-10-08 PROCEDURE — 81001 URINALYSIS AUTO W/SCOPE: CPT | Performed by: INTERNAL MEDICINE

## 2018-10-22 PROCEDURE — 86618 LYME DISEASE ANTIBODY: CPT | Performed by: OTHER

## 2018-10-22 PROCEDURE — 84165 PROTEIN E-PHORESIS SERUM: CPT | Performed by: OTHER

## 2018-10-22 PROCEDURE — 84425 ASSAY OF VITAMIN B-1: CPT | Performed by: OTHER

## 2018-10-22 PROCEDURE — 83921 ORGANIC ACID SINGLE QUANT: CPT | Performed by: OTHER

## 2018-10-22 PROCEDURE — 36415 COLL VENOUS BLD VENIPUNCTURE: CPT | Performed by: OTHER

## 2018-10-22 PROCEDURE — 83883 ASSAY NEPHELOMETRY NOT SPEC: CPT | Performed by: OTHER

## 2018-10-22 PROCEDURE — 86334 IMMUNOFIX E-PHORESIS SERUM: CPT | Performed by: OTHER

## 2018-11-29 PROBLEM — M32.9 SLE (SYSTEMIC LUPUS ERYTHEMATOSUS) (HCC): Status: ACTIVE | Noted: 2017-09-08

## 2019-02-07 PROCEDURE — 82570 ASSAY OF URINE CREATININE: CPT | Performed by: INTERNAL MEDICINE

## 2019-02-07 PROCEDURE — 82043 UR ALBUMIN QUANTITATIVE: CPT | Performed by: INTERNAL MEDICINE

## 2019-02-22 PROBLEM — I70.0 THORACIC AORTA ATHEROSCLEROSIS (HCC): Status: ACTIVE | Noted: 2019-02-22

## 2019-02-22 PROBLEM — I25.10 ATHEROSCLEROSIS OF NATIVE CORONARY ARTERY OF NATIVE HEART WITHOUT ANGINA PECTORIS: Status: ACTIVE | Noted: 2019-02-22

## 2019-02-22 PROBLEM — M47.814 SPONDYLOSIS OF THORACIC REGION WITHOUT MYELOPATHY OR RADICULOPATHY: Status: ACTIVE | Noted: 2019-02-22

## 2019-02-22 PROBLEM — J47.9 BRONCHIECTASIS WITHOUT COMPLICATION (HCC): Status: ACTIVE | Noted: 2019-02-22

## 2019-05-01 PROCEDURE — 86160 COMPLEMENT ANTIGEN: CPT | Performed by: INTERNAL MEDICINE

## 2019-05-01 PROCEDURE — 81001 URINALYSIS AUTO W/SCOPE: CPT | Performed by: INTERNAL MEDICINE

## 2019-05-01 PROCEDURE — 84156 ASSAY OF PROTEIN URINE: CPT | Performed by: INTERNAL MEDICINE

## 2019-07-26 PROBLEM — N18.30 CKD (CHRONIC KIDNEY DISEASE) STAGE 3, GFR 30-59 ML/MIN (HCC): Status: ACTIVE | Noted: 2019-07-26

## 2019-07-31 PROBLEM — M47.816 FACET ARTHROPATHY, LUMBAR: Status: ACTIVE | Noted: 2019-07-31

## 2019-07-31 PROBLEM — M51.36 DEGENERATION OF LUMBAR INTERVERTEBRAL DISC: Status: ACTIVE | Noted: 2019-07-31

## 2020-09-02 PROBLEM — D84.9 IMMUNOCOMPROMISED (HCC): Status: ACTIVE | Noted: 2020-09-02

## 2020-09-02 PROBLEM — E11.59 TYPE 2 DIABETES MELLITUS WITH CIRCULATORY DISORDER, WITHOUT LONG-TERM CURRENT USE OF INSULIN (HCC): Status: ACTIVE | Noted: 2020-09-02

## 2021-01-22 PROBLEM — E11.51 TYPE 2 DIABETES MELLITUS WITH DIABETIC PERIPHERAL ANGIOPATHY WITHOUT GANGRENE, WITHOUT LONG-TERM CURRENT USE OF INSULIN (HCC): Status: ACTIVE | Noted: 2020-09-02

## 2021-01-22 PROBLEM — E11.22 TYPE 2 DIABETES MELLITUS WITH CHRONIC KIDNEY DISEASE, WITHOUT LONG-TERM CURRENT USE OF INSULIN (HCC): Status: ACTIVE | Noted: 2021-01-22

## 2021-01-23 PROBLEM — N18.31 STAGE 3A CHRONIC KIDNEY DISEASE (HCC): Status: ACTIVE | Noted: 2019-07-26

## 2021-02-06 ENCOUNTER — HOSPITAL ENCOUNTER (EMERGENCY)
Facility: HOSPITAL | Age: 76
Discharge: HOME | End: 2021-02-06
Attending: EMERGENCY MEDICINE
Payer: MEDICARE

## 2021-02-06 VITALS
SYSTOLIC BLOOD PRESSURE: 143 MMHG | TEMPERATURE: 97 F | HEART RATE: 76 BPM | RESPIRATION RATE: 20 BRPM | DIASTOLIC BLOOD PRESSURE: 60 MMHG | OXYGEN SATURATION: 93 %

## 2021-02-06 DIAGNOSIS — S39.012A STRAIN OF LUMBAR REGION, INITIAL ENCOUNTER: Primary | ICD-10-CM

## 2021-02-06 PROCEDURE — 99283 EMERGENCY DEPT VISIT LOW MDM: CPT

## 2021-02-06 RX ORDER — TRAMADOL HYDROCHLORIDE 50 MG/1
50 TABLET ORAL EVERY 6 HOURS PRN
Qty: 10 TABLET | Refills: 0 | Status: SHIPPED | OUTPATIENT
Start: 2021-02-06 | End: 2021-02-13

## 2021-02-06 RX ORDER — CYCLOBENZAPRINE HCL 10 MG
10 TABLET ORAL ONCE
Status: COMPLETED | OUTPATIENT
Start: 2021-02-06 | End: 2021-02-06

## 2021-02-06 RX ORDER — CYCLOBENZAPRINE HCL 10 MG
10 TABLET ORAL 3 TIMES DAILY PRN
Qty: 20 TABLET | Refills: 0 | Status: SHIPPED | OUTPATIENT
Start: 2021-02-06 | End: 2021-02-08

## 2021-02-06 RX ORDER — TRAMADOL HYDROCHLORIDE 50 MG/1
50 TABLET ORAL ONCE
Status: COMPLETED | OUTPATIENT
Start: 2021-02-06 | End: 2021-02-06

## 2021-02-06 RX ORDER — LIDOCAINE 50 MG/G
1 PATCH TOPICAL EVERY 24 HOURS
Qty: 15 PATCH | Refills: 0 | Status: SHIPPED | OUTPATIENT
Start: 2021-02-06

## 2021-02-06 RX ORDER — ACETAMINOPHEN 500 MG
1000 TABLET ORAL ONCE
Status: COMPLETED | OUTPATIENT
Start: 2021-02-06 | End: 2021-02-06

## 2021-02-06 RX ORDER — IBUPROFEN 600 MG/1
600 TABLET ORAL ONCE
Status: COMPLETED | OUTPATIENT
Start: 2021-02-06 | End: 2021-02-06

## 2021-02-06 NOTE — ED INITIAL ASSESSMENT (HPI)
Pt reports back pain for a couple months pt reports been taking care of 8 year old mother and moving her and injured her back, increase in pain over last couple months

## 2021-02-06 NOTE — ED PROVIDER NOTES
Patient Seen in: [facility] AND [facility] Emergency Department      History   Patient presents with:  Back Pain    Stated Complaint: right lower back pain x 3days    HPI/Subjective:   HPI    53-year-old female with history of COPD, hypertension, hyperlipidemia, abdominal pain. Genitourinary: Negative for dysuria. Musculoskeletal: Positive for back pain. Skin: Negative for rash. Neurological: Negative for dizziness. Psychiatric/Behavioral: Negative for suicidal ideas.    All other systems reviewed and are for this or any previous visit (from the past 24 hour(s)). Imaging Results Available and Reviewed by me while in ED:  No results found.       EMERGENCY DEPARTMENT COURSE AND TREATMENT:  Patient's condition was stable during Emergency Department evaluatio expresses understanding and agrees to d/c instructions    EMERGENCY DEPARTMENT MEDICAL DECISION MAKING:  After obtaining the patient's history, performing the physical exam and reviewing the diagnostics, multiple initial diagnoses were considered based on

## 2021-02-08 PROBLEM — R60.0 LOWER EXTREMITY EDEMA: Status: ACTIVE | Noted: 2021-02-08

## 2021-02-22 PROBLEM — I50.32 CHRONIC DIASTOLIC CHF (CONGESTIVE HEART FAILURE) (HCC): Status: ACTIVE | Noted: 2021-02-22

## 2021-07-14 ENCOUNTER — LAB (OUTPATIENT)
Dept: LAB | Facility: HOSPITAL | Age: 76
End: 2021-07-14

## 2021-07-14 ENCOUNTER — OFFICE VISIT (OUTPATIENT)
Dept: GASTROENTEROLOGY | Facility: CLINIC | Age: 76
End: 2021-07-14

## 2021-07-14 VITALS
HEIGHT: 65 IN | WEIGHT: 169 LBS | SYSTOLIC BLOOD PRESSURE: 102 MMHG | TEMPERATURE: 97.3 F | BODY MASS INDEX: 28.16 KG/M2 | DIASTOLIC BLOOD PRESSURE: 60 MMHG

## 2021-07-14 DIAGNOSIS — Z86.010 PERSONAL HISTORY OF COLONIC POLYPS: ICD-10-CM

## 2021-07-14 DIAGNOSIS — R13.10 DYSPHAGIA, UNSPECIFIED TYPE: ICD-10-CM

## 2021-07-14 DIAGNOSIS — D50.0 IRON DEFICIENCY ANEMIA DUE TO CHRONIC BLOOD LOSS: ICD-10-CM

## 2021-07-14 DIAGNOSIS — Z87.19 HISTORY OF COLITIS: ICD-10-CM

## 2021-07-14 DIAGNOSIS — K55.20 ANGIODYSPLASIA OF COLON: ICD-10-CM

## 2021-07-14 DIAGNOSIS — D50.0 IRON DEFICIENCY ANEMIA DUE TO CHRONIC BLOOD LOSS: Primary | ICD-10-CM

## 2021-07-14 DIAGNOSIS — Z01.818 PREOP TESTING: Primary | ICD-10-CM

## 2021-07-14 DIAGNOSIS — K21.9 GASTROESOPHAGEAL REFLUX DISEASE WITHOUT ESOPHAGITIS: ICD-10-CM

## 2021-07-14 LAB
BASOPHILS # BLD AUTO: 0.07 10*3/MM3 (ref 0–0.2)
BASOPHILS NFR BLD AUTO: 0.9 % (ref 0–1.5)
DEPRECATED RDW RBC AUTO: 57.7 FL (ref 37–54)
EOSINOPHIL # BLD AUTO: 0.15 10*3/MM3 (ref 0–0.4)
EOSINOPHIL NFR BLD AUTO: 1.9 % (ref 0.3–6.2)
ERYTHROCYTE [DISTWIDTH] IN BLOOD BY AUTOMATED COUNT: 16.7 % (ref 12.3–15.4)
HCT VFR BLD AUTO: 39.8 % (ref 34–46.6)
HGB BLD-MCNC: 12.6 G/DL (ref 12–15.9)
IMM GRANULOCYTES # BLD AUTO: 0.02 10*3/MM3 (ref 0–0.05)
IMM GRANULOCYTES NFR BLD AUTO: 0.3 % (ref 0–0.5)
LYMPHOCYTES # BLD AUTO: 0.83 10*3/MM3 (ref 0.7–3.1)
LYMPHOCYTES NFR BLD AUTO: 10.6 % (ref 19.6–45.3)
MCH RBC QN AUTO: 29.2 PG (ref 26.6–33)
MCHC RBC AUTO-ENTMCNC: 31.7 G/DL (ref 31.5–35.7)
MCV RBC AUTO: 92.3 FL (ref 79–97)
MONOCYTES # BLD AUTO: 0.56 10*3/MM3 (ref 0.1–0.9)
MONOCYTES NFR BLD AUTO: 7.2 % (ref 5–12)
NEUTROPHILS NFR BLD AUTO: 6.18 10*3/MM3 (ref 1.7–7)
NEUTROPHILS NFR BLD AUTO: 79.1 % (ref 42.7–76)
NRBC BLD AUTO-RTO: 0 /100 WBC (ref 0–0.2)
PLATELET # BLD AUTO: 247 10*3/MM3 (ref 140–450)
PMV BLD AUTO: 10.7 FL (ref 6–12)
RBC # BLD AUTO: 4.31 10*6/MM3 (ref 3.77–5.28)
WBC # BLD AUTO: 7.81 10*3/MM3 (ref 3.4–10.8)

## 2021-07-14 PROCEDURE — 36415 COLL VENOUS BLD VENIPUNCTURE: CPT

## 2021-07-14 PROCEDURE — 80053 COMPREHEN METABOLIC PANEL: CPT

## 2021-07-14 PROCEDURE — 82728 ASSAY OF FERRITIN: CPT

## 2021-07-14 PROCEDURE — 99204 OFFICE O/P NEW MOD 45 MIN: CPT | Performed by: INTERNAL MEDICINE

## 2021-07-14 PROCEDURE — 83540 ASSAY OF IRON: CPT

## 2021-07-14 PROCEDURE — 85025 COMPLETE CBC W/AUTO DIFF WBC: CPT

## 2021-07-14 PROCEDURE — 84466 ASSAY OF TRANSFERRIN: CPT

## 2021-07-14 RX ORDER — ERGOCALCIFEROL 1.25 MG/1
50000 CAPSULE ORAL
COMMUNITY
End: 2022-09-22

## 2021-07-14 RX ORDER — PRAMIPEXOLE DIHYDROCHLORIDE 0.5 MG/1
0.5 TABLET ORAL 3 TIMES DAILY
COMMUNITY
End: 2021-08-25

## 2021-07-14 RX ORDER — SODIUM CHLORIDE 9 MG/ML
70 INJECTION, SOLUTION INTRAVENOUS CONTINUOUS PRN
Status: CANCELLED | OUTPATIENT
Start: 2021-07-14

## 2021-07-14 RX ORDER — BUSPIRONE HYDROCHLORIDE 10 MG/1
10 TABLET ORAL 3 TIMES DAILY
COMMUNITY

## 2021-07-14 RX ORDER — SODIUM, POTASSIUM,MAG SULFATES 17.5-3.13G
2 SOLUTION, RECONSTITUTED, ORAL ORAL ONCE
Qty: 354 ML | Refills: 0 | Status: SHIPPED | OUTPATIENT
Start: 2021-07-14 | End: 2021-07-14

## 2021-07-14 RX ORDER — ALENDRONATE SODIUM 70 MG/1
70 TABLET ORAL
COMMUNITY

## 2021-07-14 NOTE — PROGRESS NOTES
New Patient Consult      Date: 2021   Patient Name: Padmini Leonard  MRN: [id number]  : 1945     Referring Physician: Sarah Frost DO    Chief Complaint   Patient presents with   • Anemia       History of Present Illness: Padmini Leonard is a 76 y.o. female who is here today to establish care with Gastroenterology for evaluation of anemia.  Patient has a chronic anemia at least for the past 8 years now.  Her hemoglobin as per record dropped to 6 once in .  Her hemoglobin was 9.3 in May 2021 and improved to 11.6 after iron pills.. Iron studies revealed normal iron and ferritin with iron saturation 72%.  This was done mostly with iron pills.  Her folic acid and the vitamin B12 level was normal in May 2021.  Platelets were normal liver enzymes were normal except borderline elevated alkaline phosphatase.  Patient is currently on Plavix. He has ongoing reflux symptoms and symptoms controlled with omeprazole 40mg po daily. She will get occasional rt lower abdomen.     This patient deny any change in bowel habit, hematochezia or melena.  Weight is stable. Pt denies nausea vomiting or odynophagia. She will have choking at throat with solid and liquids since about a year.  Prior history of EGD over ten yrs and last colonoscopy was in , inflammation found at [facility] , biopsy from the ascending colon path revealed chronic colitis. Pre was poor, few left colon polyps noted that was nott removed. As per record she had multiple polyps removed including advanced polyp with EMR before thant. She had rt colon AVM which was ablated. No family history of colon cancer or any GI malignancy. No history of any abdominal surgery. Denies alcohol abuse or cigarette smoking.     She has lupus/ RA on plaquanil      2015  The patient came back for follow visit today. The patient is doing reasonably well from GI point of view. She denies nausea vomiting. There is no abdominal pain. She denies reflux.  There is no dysphagia or odynophagia. There is no history of overt GI bleed (hematemesis melena or hematochezia). She denies recent weight loss. She has good appetite. There is no liver or pancreatic disease.  As you recall the patient has been hospitalized recently in June 2015 with history of left lower extremity ischemia. She was found to be significantly anemic as well. 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 Furthermore the patient underwent a colonoscopy. She was found to have some polypoid areas which were not removed in anticipation of potential vascular intervention necessitating anticoagulation. The patient was noted to have a small ulceration at the ileocecal valve biopsies of which revealed mucosal excoriation with associated chronic colitis. Currently the patient feels okay. She still has left ischemic foot. Unfortunately the patient continues to smoke. The patient is smoking perhaps about a pack a day.        Subjective      Past Medical History:   Past Medical History:   Diagnosis Date   • Anxiety    • Arthritis    • Backache     H/o   • Bell palsy    • H/O renal calculi    • History of blood transfusion    • Ischemic ulcer of left foot (CMS/HCC)     · Ischemic left foot. 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 Unfortunately  the patient continues to smoke. Long discussion was undertaken with the patient. She had accepteda request ti give a serious consideration to quit smoking   • Lupus (CMS/HCC)    • Peptic ulcer     H/o   • Shingles    • Vertigo     H/o       Past Surgical History:   Past Surgical History:   Procedure Laterality Date   • APPENDECTOMY     • COLONOSCOPY      screening   • ENDOSCOPY      with biopsy   • EYE SURGERY      Cataract   • LAPAROSCOPIC TUBAL LIGATION     • TUBAL ABDOMINAL LIGATION         Family History:   Family History   Problem Relation Age of Onset   •  Lung cancer Father    • Liver cancer Sister        Social History:   Social History     Socioeconomic History   • Marital status:      Spouse name: Not on file   • Number of children: Not on file   • Years of education: Not on file   • Highest education level: Not on file   Tobacco Use   • Smoking status: Current Every Day Smoker   • Smokeless tobacco: Never Used   Substance and Sexual Activity   • Alcohol use: No   • Drug use: No         Current Outpatient Medications:   •  alendronate (FOSAMAX) 70 MG tablet, Take 70 mg by mouth Every 7 (Seven) Days., Disp: , Rfl:   •  amLODIPine (NORVASC) 2.5 MG tablet, 1 tablet Daily., Disp: , Rfl:   •  atorvastatin (LIPITOR) 80 MG tablet, Take 80 mg by mouth Daily., Disp: , Rfl:   •  busPIRone (BUSPAR) 10 MG tablet, Take 10 mg by mouth 3 (Three) Times a Day., Disp: , Rfl:   •  carbidopa-levodopa (SINEMET)  MG per tablet, Take 1 tablet by mouth Daily., Disp: , Rfl:   •  citalopram (CeleXA) 40 MG tablet, Take 1 tablet by mouth Daily., Disp: , Rfl:   •  clopidogrel (PLAVIX) 75 MG tablet, Take 75 mg by mouth Daily., Disp: , Rfl:   •  ferrous sulfate 325 (65 FE) MG tablet, Take 325 mg by mouth Daily With Breakfast., Disp: , Rfl:   •  hydroxychloroquine (PLAQUENIL) 200 MG tablet, Take  by mouth 2 (Two) Times a Day., Disp: , Rfl:   •  pramipexole (MIRAPEX) 0.5 MG tablet, Take 0.5 mg by mouth 3 (Three) Times a Day., Disp: , Rfl:   •  ramipril (ALTACE) 5 MG capsule, , Disp: , Rfl:   •  TiZANidine (ZANAFLEX) 4 MG capsule, Take  by mouth Daily., Disp: , Rfl:   •  vitamin D (ERGOCALCIFEROL) 1.25 MG (79673 UT) capsule capsule, Take 50,000 Units by mouth Every 14 (Fourteen) Days., Disp: , Rfl:   •  sodium-potassium-magnesium sulfates (Suprep Bowel Prep Kit) 17.5-3.13-1.6 GM/177ML solution oral solution, Take 2 bottles by mouth 1 (One) Time for 1 dose. Please see prep instructions from office., Disp: 354 mL, Rfl: 0    Allergies   Allergen Reactions   • Penicillins    • Sulfa  "Antibiotics        Review of Systems:   Review of Systems   Constitutional: Negative for appetite change, fatigue, fever and unexpected weight loss.   HENT: Positive for trouble swallowing.    Respiratory: Negative for cough, shortness of breath and wheezing.    Cardiovascular: Negative for chest pain, palpitations and leg swelling.   Gastrointestinal: Positive for abdominal pain and GERD. Negative for abdominal distention, anal bleeding, blood in stool, constipation, diarrhea, nausea, rectal pain, vomiting and indigestion.   Genitourinary: Negative for dysuria, frequency and hematuria.   Musculoskeletal: Negative for back pain and joint swelling.   Skin: Negative for color change, rash and skin lesions.   Neurological: Negative for dizziness, syncope, speech difficulty, weakness, headache and memory problem.   Hematological: Negative for adenopathy. Does not bruise/bleed easily.   Psychiatric/Behavioral: Negative for agitation, behavioral problems, suicidal ideas and depressed mood.       The following portions of the patient's history were reviewed and updated as appropriate: allergies, current medications, past family history, past medical history, past social history, past surgical history and problem list.    Objective     Physical Exam:  Vital Signs:   Vitals:    07/14/21 1538   BP: 102/60   Temp: 97.3 °F (36.3 °C)   TempSrc: Temporal   Weight: 76.7 kg (169 lb)   Height: 163.8 cm (64.5\")       Physical Exam  Vitals and nursing note reviewed.   Constitutional:       Appearance: She is well-developed.   HENT:      Head: Normocephalic and atraumatic.      Right Ear: External ear normal.      Left Ear: External ear normal.   Eyes:      Comments: Pallor present   Neck:      Thyroid: No thyromegaly.      Trachea: No tracheal deviation.   Cardiovascular:      Rate and Rhythm: Normal rate and regular rhythm.      Heart sounds: No murmur heard.     Pulmonary:      Effort: Pulmonary effort is normal. No respiratory " distress.      Breath sounds: Normal breath sounds.   Abdominal:      General: Bowel sounds are normal. There is no distension.      Palpations: Abdomen is soft. There is no mass.      Tenderness: There is no abdominal tenderness.      Hernia: No hernia is present.   Musculoskeletal:         General: Normal range of motion.      Cervical back: Normal range of motion and neck supple.   Skin:     General: Skin is warm and dry.   Neurological:      General: No focal deficit present.      Mental Status: She is alert and oriented to person, place, and time.      Cranial Nerves: No cranial nerve deficit.      Sensory: No sensory deficit.   Psychiatric:         Mood and Affect: Mood normal.         Behavior: Behavior normal.         Thought Content: Thought content normal.         Judgment: Judgment normal.         Results Review:   I have reviewed the patient's new clinical and imaging results and agree with the interpretation.     No visits with results within 90 Day(s) from this visit.   Latest known visit with results is:   Abstract on 09/08/2017   Component Date Value Ref Range Status   •  Colonoscopy 06/24/2015  Right colonic vascular ectasia status post thermal ablation therapy. Proximal ascending colon superficial clean-based healing ulcerations (biopsied). Scant early diverticular changes in the left colon. Multiple colon polyps-not biopsied or removed.   Final      No radiology results for the last 90 days.    Assessment / Plan      Assessment & Plan:  1. Iron deficiency anemia due to chronic blood loss  2. Angiodysplasia of colon  3.  History of chronic colitis  Patient has intermittent anemia for the past 7 - 8 years now.  Etiology is unclear however possible GI bleed is suspected.  She did have a work-up done for the anemia in 2015.  As per record her colonoscopy done revealed ascending colon inflammation ulceration and pathology consistent with chronic colitis.  She also had a right-sided colonic AVMs which  were ablated.  Her prep was poor and few small colon polyps were not removed.  Unclear whether patient has any inflammatory bowel disease as she also has a lupus and rheumatoid arthritis.  However she does not have any current significant lower GI symptoms.    She needs an EGD and colonoscopy for further evaluation.  She did have a tortuous long colon as per record.  Patient also has some issues with the dysphagia.   We will schedule these 2 procedures separately.  We will continue her iron pills for now  CBC, CMP, iron studies now  We will also get a CT scan abdomen pelvis to rule out any solid organ pathology.  She may need small bowel PillCam study if EGD colonoscopy is normal    - CT Abdomen Pelvis With Contrast; Future  - CBC Auto Differential; Future  - Comprehensive Metabolic Panel; Future  - Iron Profile; Future  - Ferritin; Future  - Case Request; Standing  - Implement Anesthesia Orders Day of Procedure; Standing  - Obtain Informed Consent; Standing  - Oxygen Therapy- Nasal Cannula; 2 LPM; Titrate for SPO2: equal to or greater than, 90%; Standing  - sodium chloride 0.9 % infusion  - Case Request  - Case Request; Standing  - Implement Anesthesia Orders Day of Procedure; Standing  - Obtain Informed Consent; Standing  - Verify Bowel Prep Was Successful; Standing  - Oxygen Therapy- Nasal Cannula; 2 LPM; Titrate for SPO2: equal to or greater than, 90%; Standing  - sodium chloride 0.9 % infusion  - Case Request      3. Personal history of colonic polyps  Patient has a prior history of multiple colon polyps removed including the advanced polyp with a EMR technique in the past.. Last colonoscopy was in 2015 with poor prep and had a few left-sided small polyps which were not removed.  She was advised to repeat the colonoscopy in 1 year time but patient did not keep up the appointment.  She needs a surveillance colonoscopy now will schedule the same.    The indications, technique, alternatives and potential risk and  complications were discussed with the patient including but not limited to bleeding, bowel perforations, missing lesions and anesthetic complications. The patient understands and wishes to proceed with the procedure and has given their verbal consent. Written patient education information was given to the patient.   The patient will call if they have further questions before procedure.     4. Gastroesophageal reflux disease without esophagitis  5.  Dysphagia unspecified  Patient appears to have some degree for dysmotility .  I doubt she has any esophageal mechanical obstruction.    No significant reflux symptoms while on omeprazole 40 mg p.o. daily dose .  Her last EGD was over 10 years ago.  Will recommend on a PPI dosage when once the endoscopy is performed.      Follow Up:   Return for Follow Up after procedure.    Kathryn Stewart MD  [facility] [place]  7/14/2021  16:25 EDT    Please note that portions of this note may have been completed with a voice recognition program.

## 2021-07-15 LAB
ALBUMIN SERPL-MCNC: 4.1 G/DL (ref 3.5–5.2)
ALBUMIN/GLOB SERPL: 1.7 G/DL
ALP SERPL-CCNC: 127 U/L (ref 39–117)
ALT SERPL W P-5'-P-CCNC: 10 U/L (ref 1–33)
ANION GAP SERPL CALCULATED.3IONS-SCNC: 8.7 MMOL/L (ref 5–15)
AST SERPL-CCNC: 13 U/L (ref 1–32)
BILIRUB SERPL-MCNC: 0.2 MG/DL (ref 0–1.2)
BUN SERPL-MCNC: 16 MG/DL (ref 8–23)
BUN/CREAT SERPL: 18.2 (ref 7–25)
CALCIUM SPEC-SCNC: 9 MG/DL (ref 8.6–10.5)
CHLORIDE SERPL-SCNC: 103 MMOL/L (ref 98–107)
CO2 SERPL-SCNC: 27.3 MMOL/L (ref 22–29)
CREAT SERPL-MCNC: 0.88 MG/DL (ref 0.57–1)
FERRITIN SERPL-MCNC: 13.9 NG/ML (ref 13–150)
GFR SERPL CREATININE-BSD FRML MDRD: 62 ML/MIN/1.73
GLOBULIN UR ELPH-MCNC: 2.4 GM/DL
GLUCOSE SERPL-MCNC: 94 MG/DL (ref 65–99)
IRON 24H UR-MRATE: 44 MCG/DL (ref 37–145)
IRON SATN MFR SERPL: 10 % (ref 20–50)
POTASSIUM SERPL-SCNC: 4.5 MMOL/L (ref 3.5–5.2)
PROT SERPL-MCNC: 6.5 G/DL (ref 6–8.5)
SODIUM SERPL-SCNC: 139 MMOL/L (ref 136–145)
TIBC SERPL-MCNC: 434 MCG/DL (ref 298–536)
TRANSFERRIN SERPL-MCNC: 291 MG/DL (ref 200–360)

## 2021-07-27 PROBLEM — Z86.010 PERSONAL HISTORY OF COLONIC POLYPS: Status: ACTIVE | Noted: 2021-07-27

## 2021-07-27 PROBLEM — K55.20 ANGIODYSPLASIA OF COLON: Status: ACTIVE | Noted: 2021-07-27

## 2021-07-27 PROBLEM — Z86.0100 PERSONAL HISTORY OF COLONIC POLYPS: Status: ACTIVE | Noted: 2021-07-27

## 2021-07-30 ENCOUNTER — HOSPITAL ENCOUNTER (OUTPATIENT)
Dept: CT IMAGING | Facility: HOSPITAL | Age: 76
Discharge: HOME | End: 2021-07-30
Admitting: INTERNAL MEDICINE

## 2021-07-30 DIAGNOSIS — D50.0 IRON DEFICIENCY ANEMIA DUE TO CHRONIC BLOOD LOSS: ICD-10-CM

## 2021-07-30 PROCEDURE — 0 DIATRIZOATE MEGLUMINE & SODIUM PER 1 ML: Performed by: INTERNAL MEDICINE

## 2021-07-30 PROCEDURE — 25010000002 IOPAMIDOL 61 % SOLUTION: Performed by: INTERNAL MEDICINE

## 2021-07-30 PROCEDURE — 74177 CT ABD & PELVIS W/CONTRAST: CPT

## 2021-07-30 RX ORDER — IBUPROFEN 200 MG
200 TABLET ORAL EVERY 6 HOURS PRN
COMMUNITY
End: 2021-08-02 | Stop reason: HOSPADM

## 2021-07-30 RX ADMIN — DIATRIZOATE MEGLUMINE AND DIATRIZOATE SODIUM 30 ML: 660; 100 LIQUID ORAL; RECTAL at 11:30

## 2021-07-30 RX ADMIN — IOPAMIDOL 100 ML: 612 INJECTION, SOLUTION INTRAVENOUS at 13:12

## 2021-08-02 ENCOUNTER — ANESTHESIA EVENT (OUTPATIENT)
Dept: GASTROENTEROLOGY | Facility: HOSPITAL | Age: 76
End: 2021-08-02

## 2021-08-02 ENCOUNTER — HOSPITAL ENCOUNTER (OUTPATIENT)
Facility: HOSPITAL | Age: 76
Setting detail: HOSPITAL OUTPATIENT SURGERY
Discharge: HOME | End: 2021-08-02
Attending: INTERNAL MEDICINE | Admitting: INTERNAL MEDICINE

## 2021-08-02 ENCOUNTER — ANESTHESIA (OUTPATIENT)
Dept: GASTROENTEROLOGY | Facility: HOSPITAL | Age: 76
End: 2021-08-02

## 2021-08-02 VITALS
TEMPERATURE: 97.1 F | SYSTOLIC BLOOD PRESSURE: 147 MMHG | OXYGEN SATURATION: 93 % | BODY MASS INDEX: 28.32 KG/M2 | WEIGHT: 170 LBS | HEART RATE: 68 BPM | RESPIRATION RATE: 16 BRPM | HEIGHT: 65 IN | DIASTOLIC BLOOD PRESSURE: 64 MMHG

## 2021-08-02 DIAGNOSIS — K55.20 ANGIODYSPLASIA OF COLON: ICD-10-CM

## 2021-08-02 DIAGNOSIS — D50.0 IRON DEFICIENCY ANEMIA DUE TO CHRONIC BLOOD LOSS: ICD-10-CM

## 2021-08-02 DIAGNOSIS — Z86.010 PERSONAL HISTORY OF COLONIC POLYPS: ICD-10-CM

## 2021-08-02 LAB — SARS-COV-2 RNA PNL SPEC NAA+PROBE: NOT DETECTED

## 2021-08-02 PROCEDURE — 43255 EGD CONTROL BLEEDING ANY: CPT | Performed by: INTERNAL MEDICINE

## 2021-08-02 PROCEDURE — 43239 EGD BIOPSY SINGLE/MULTIPLE: CPT | Performed by: INTERNAL MEDICINE

## 2021-08-02 PROCEDURE — 88305 TISSUE EXAM BY PATHOLOGIST: CPT | Performed by: INTERNAL MEDICINE

## 2021-08-02 PROCEDURE — 25010000002 PROPOFOL 1000 MG/100ML EMULSION: Performed by: NURSE ANESTHETIST, CERTIFIED REGISTERED

## 2021-08-02 PROCEDURE — C9803 HOPD COVID-19 SPEC COLLECT: HCPCS

## 2021-08-02 PROCEDURE — 87635 SARS-COV-2 COVID-19 AMP PRB: CPT | Performed by: INTERNAL MEDICINE

## 2021-08-02 PROCEDURE — C1889 IMPLANT/INSERT DEVICE, NOC: HCPCS | Performed by: INTERNAL MEDICINE

## 2021-08-02 DEVICE — DEV CLIP ENDO RESOLUTION360 CONTRL ROT 235CM: Type: IMPLANTABLE DEVICE | Site: ESOPHAGUS | Status: FUNCTIONAL

## 2021-08-02 RX ORDER — SODIUM CHLORIDE 9 MG/ML
70 INJECTION, SOLUTION INTRAVENOUS CONTINUOUS PRN
Status: DISCONTINUED | OUTPATIENT
Start: 2021-08-02 | End: 2021-08-02 | Stop reason: HOSPADM

## 2021-08-02 RX ORDER — PROPOFOL 10 MG/ML
INJECTION, EMULSION INTRAVENOUS AS NEEDED
Status: DISCONTINUED | OUTPATIENT
Start: 2021-08-02 | End: 2021-08-02 | Stop reason: SURG

## 2021-08-02 RX ORDER — LIDOCAINE HYDROCHLORIDE 20 MG/ML
INJECTION, SOLUTION INTRAVENOUS AS NEEDED
Status: DISCONTINUED | OUTPATIENT
Start: 2021-08-02 | End: 2021-08-02 | Stop reason: SURG

## 2021-08-02 RX ORDER — SODIUM CHLORIDE 0.9 % (FLUSH) 0.9 %
10 SYRINGE (ML) INJECTION AS NEEDED
Status: DISCONTINUED | OUTPATIENT
Start: 2021-08-02 | End: 2021-08-02 | Stop reason: HOSPADM

## 2021-08-02 RX ORDER — PANTOPRAZOLE SODIUM 40 MG/1
40 TABLET, DELAYED RELEASE ORAL DAILY
Qty: 30 TABLET | Refills: 5 | Status: SHIPPED | OUTPATIENT
Start: 2021-08-02 | End: 2021-08-25

## 2021-08-02 RX ADMIN — PROPOFOL 50 MG: 10 INJECTION, EMULSION INTRAVENOUS at 08:31

## 2021-08-02 RX ADMIN — PROPOFOL 50 MG: 10 INJECTION, EMULSION INTRAVENOUS at 08:39

## 2021-08-02 RX ADMIN — PROPOFOL 50 MG: 10 INJECTION, EMULSION INTRAVENOUS at 08:35

## 2021-08-02 RX ADMIN — SODIUM CHLORIDE 70 ML/HR: 9 INJECTION, SOLUTION INTRAVENOUS at 07:10

## 2021-08-02 RX ADMIN — LIDOCAINE HYDROCHLORIDE 60 MG: 20 INJECTION, SOLUTION INTRAVENOUS at 08:28

## 2021-08-02 RX ADMIN — PROPOFOL 50 MG: 10 INJECTION, EMULSION INTRAVENOUS at 08:41

## 2021-08-02 RX ADMIN — PROPOFOL 100 MG: 10 INJECTION, EMULSION INTRAVENOUS at 08:28

## 2021-08-02 NOTE — ANESTHESIA POSTPROCEDURE EVALUATION
Patient: Padmini Leonard    Procedure Summary     Date: 08/02/21 Room / Location: [facility] / [facility]    Anesthesia Start: 0823 Anesthesia Stop: 0842    Procedure: ESOPHAGOGASTRODUODENOSCOPY WITH BIOPSIES AND CAUTERIZATION OF AVM AND PLACEMENT OF CLIP (N/A Esophagus) Diagnosis:       Iron deficiency anemia due to chronic blood loss      (Iron deficiency anemia due to chronic blood loss [D50.0])    Surgeons: Kathryn Stewart MD Provider: Jefe Doran CRNA    Anesthesia Type: MAC ASA Status: 3          Anesthesia Type: MAC    Vitals  Vitals Value Taken Time   /64 08/02/21 0912   Temp 97.1 °F (36.2 °C) 08/02/21 0847   Pulse 68 08/02/21 0912   Resp 16 08/02/21 0912   SpO2 93 % 08/02/21 0912           Post Anesthesia Care and Evaluation    Patient location during evaluation: PHASE II  Patient participation: complete - patient participated  Level of consciousness: awake and sleepy but conscious  Pain management: adequate  Airway patency: patent  Anesthetic complications: No anesthetic complications  PONV Status: none  Cardiovascular status: acceptable and hemodynamically stable  Respiratory status: acceptable, nonlabored ventilation, spontaneous ventilation and nasal cannula  Hydration status: acceptable

## 2021-08-02 NOTE — ANESTHESIA PREPROCEDURE EVALUATION
Anesthesia Evaluation     Patient summary reviewed and Nursing notes reviewed   no history of anesthetic complications:  NPO Solid Status: > 8 hours  NPO Liquid Status: > 8 hours           Airway   Mallampati: III  TM distance: <3 FB  Neck ROM: full  Possible difficult intubation  Dental - normal exam     Pulmonary - normal exam   (+) a smoker Current Smoked day of surgery, COPD,     ROS comment: Smoker 90 pack years  Cardiovascular - normal exam    (+) hypertension 2 medications or greater, CHF , hyperlipidemia,     ROS comment: PLAVIX - LAST 7/30/2021    Neuro/Psych  (+) numbness, psychiatric history Anxiety,       ROS Comment: Neuropathy bilateral LE's - worse on left  GI/Hepatic/Renal/Endo    (+)  GERD, PUD,  renal disease stones, thyroid problem hypothyroidism    Musculoskeletal     Abdominal  - normal exam   Substance History - negative use  (-) alcohol use, drug use     OB/GYN negative ob/gyn ROS   (-)  Pregnant        Other   arthritis,      ROS/Med Hx Other: Heartburn  Anxiety  Fibromyalgia  Hyperthyroidism  Osteoarthritis  RA (rheumatoid arthritis) (CMS/[facility])  SLE (systemic lupus erythematosus) (CMS/[facility])  Restless legs syndrome (RLS)  Tobacco abuse  Colon ulcer  Anemia  Gastric and duodenal angiodysplasia  Angiodysplasia of colon  Personal history of colonic polyps  Ischemic ulcer to left foot  Lupus  Shingles  [place] palsy    UpPERIPHERAL ARTERIAL STENT GRAFTper partial dentures                  Anesthesia Plan    ASA 3     MAC   (Patient advised that intravenous sedation would be utilized as primary anesthetic technique. Every effort will be made to make sure patient is comfortable. Patient advised that they may experience recall of events of the procedure. Patient verbalized understanding and agreed to plan. )  intravenous induction     Anesthetic plan, all risks, benefits, and alternatives have been provided, discussed and informed consent has been obtained with: patient.    Plan discussed with  CRNA.

## 2021-08-02 NOTE — DISCHARGE INSTRUCTIONS
Please follow all post op instructions and follow up appointment time from your physician's office included in your discharge packet.  .   No pushing, pulling, tugging,  heavy lifting, or strenuous activity.  No major decision making, driving, or drinking alcoholic beverages for 24 hours. ( due to the medications you have  received)  Always use good hand hygiene/washing techniques.  NO driving while taking pain medications.    * if you have an incision:  Check your incision area every day for signs of infection.   Check for:  * more redness, swelling, or pain  *more fluid or blood  *warmth  *pus or bad smell  To assist you in voiding:  Drink plenty of fluids  Listen to running water while attempting to void.    If you are unable to urinate and you have an uncomfortable urge to void or it has been   6 hours since you were discharged, return to the Emergency Room    - Discharge patient to home (ambulatory).   - Mechanical soft diet today.   - Continue present medications.   - Avoid NSAIDS  - Ok to take tylenol  - Hold plavix for 7 days  - PPI daily  - May not be a candidate for any anticoagulation  - Await pathology results.   - Return to my office in 6 weeks.   - may need Pilcam as if any further anemia

## 2021-08-05 LAB
LAB AP CASE REPORT: NORMAL
PATH REPORT.FINAL DX SPEC: NORMAL

## 2021-08-25 ENCOUNTER — OFFICE VISIT (OUTPATIENT)
Dept: NEUROLOGY | Facility: CLINIC | Age: 76
End: 2021-08-25

## 2021-08-25 VITALS
SYSTOLIC BLOOD PRESSURE: 130 MMHG | WEIGHT: 167.8 LBS | DIASTOLIC BLOOD PRESSURE: 70 MMHG | HEART RATE: 85 BPM | HEIGHT: 55 IN | TEMPERATURE: 96.4 F | BODY MASS INDEX: 38.83 KG/M2 | OXYGEN SATURATION: 95 %

## 2021-08-25 DIAGNOSIS — R26.89 POOR BALANCE: ICD-10-CM

## 2021-08-25 DIAGNOSIS — G25.81 RESTLESS LEGS SYNDROME (RLS): ICD-10-CM

## 2021-08-25 DIAGNOSIS — G25.0 BENIGN HEAD TREMOR: ICD-10-CM

## 2021-08-25 DIAGNOSIS — R25.1 TREMOR OF BOTH HANDS: Primary | ICD-10-CM

## 2021-08-25 DIAGNOSIS — G60.9 IDIOPATHIC PERIPHERAL NEUROPATHY: ICD-10-CM

## 2021-08-25 DIAGNOSIS — Z86.2 HISTORY OF ANEMIA: ICD-10-CM

## 2021-08-25 PROCEDURE — 99204 OFFICE O/P NEW MOD 45 MIN: CPT | Performed by: NURSE PRACTITIONER

## 2021-08-25 RX ORDER — PRAMIPEXOLE DIHYDROCHLORIDE 0.5 MG/1
0.5 TABLET ORAL 2 TIMES DAILY
Qty: 60 TABLET | Refills: 2 | Status: SHIPPED | OUTPATIENT
Start: 2021-08-25 | End: 2021-11-03

## 2021-08-25 NOTE — PROGRESS NOTES
"     New Patient Office Visit      Patient Name: Padmini Leonard  : 1945   MRN: [id number]     Referring Physician: Sarah Frost DO    Chief Complaint:    Chief Complaint   Patient presents with   • Consult     patient in office to establish care for abnormal gait and balance       History of Present Illness: Padmini Leonard is a 76 y.o. female who is here today to establish care with Neurology.  Patient's referral is for neuropathy but patient says she is here because of her unsteady gait and problems with balance.  She saw a neurologist in [place] and has been on Sinemet for at least 6 years.  She is taking Sinemet 25/100mg TID.  She feels the medication did help with her symptoms when she started it.  She does feel her symptoms have been worse over the past year.  She says she has never been formally diagnosed with Parkinson's disease.  She has a tremor when she paints and writes.  She does have a decreased sense of smell.  She denies significant constipation.  She denies blurry vision.  She has had a couple of near falls recently but has been able to grab onto something to keep from falling.  She is unable to climb a step ladder because she loses her balance.  She can't walk well in the grass and states \"I'm like a drunk\".  She denies any previous use of antipsychotic medications.  She was previously diagnosed with peripheral neuropathy.  She took Gabapentin in the past, for neuropathy, and did not tolerate it well.  Additional risk factors- BMI 28, RLS, peripheral neuropathy, mood disorder, dyslipidemia, anemia, HTN, low back pain, cigarette smoker, RA, lupus, history of hyperthyroidism, COPD, fibromyalgia.   *At the end of the visit the patient says, \"Well, who do I see about my restless leg?\".  She is taking Pramipexole 0.5mg QHS and states \"I just started that\"-but then goes on to say she has been taking it for about 4 months.  The medication helps her sleep.  She continues to have " frequent leg movements during the day.  She has had symptoms of restless leg syndrome for several years. She says when her iron level is low her leg movements are much worse.  She is taking supplemental iron at this time.   *PCP referral note reviewed prior to visit.     Pertinent Diagnostics-  *TSH on 2/23/2021 was normal.     Subjective      Review of Systems:   Review of Systems   Constitutional: Negative for fatigue, fever, unexpected weight gain and unexpected weight loss.   HENT: Negative for hearing loss, sore throat, swollen glands, tinnitus and trouble swallowing.    Eyes: Negative for blurred vision, double vision, photophobia and visual disturbance.   Respiratory: Negative for cough, chest tightness and shortness of breath.    Cardiovascular: Negative for chest pain, palpitations and leg swelling.   Gastrointestinal: Negative for constipation, diarrhea and nausea.   Endocrine: Negative for cold intolerance and heat intolerance.   Musculoskeletal: Positive for gait problem. Negative for neck pain and neck stiffness.   Skin: Negative for color change and rash.   Allergic/Immunologic: Negative for environmental allergies and food allergies.   Neurological: Positive for tremors and numbness. Negative for dizziness, syncope, facial asymmetry, speech difficulty, weakness, headache, memory problem and confusion.   Psychiatric/Behavioral: Negative for agitation, behavioral problems and depressed mood. The patient is not nervous/anxious.        Past Medical History:   Past Medical History:   Diagnosis Date   • Anxiety    • Arthritis    • Asthma    • Backache     H/o   • Bell palsy    • Bell's palsy    • Blood disorder    • Cataracts, bilateral    • CHF (congestive heart failure) (CMS/[facility])    • COPD (chronic obstructive pulmonary disease) (CMS/[facility])     emphysema   • Disease of thyroid gland    • Diverticulosis 07/30/2021   • Elevated cholesterol    • Fibromyalgia    • Fractures    • Gait abnormality    • GERD  (gastroesophageal reflux disease)    • H/O renal calculi    • History of blood transfusion    • Hx of echocardiogram     Ilinoise   • Hyperlipidemia    • Hypertension    • Ischemic ulcer of left foot (CMS/HCC)     · Ischemic left foot. 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 Unfortunately  the patient continues to smoke. Long discussion was undertaken with the patient. She had accepteda request ti give a serious consideration to quit smoking   • Lupus (CMS/HCC)    • Lupus (CMS/HCC)    • Neuropathy 07/30/2021    bilateral LLE worse on left   • Peptic ulcer     H/o   • Peripheral vascular disease (CMS/HCC)    • Piercing 07/30/2021    ears only   • Shingles    • Tattoo     x1   • Wears partial dentures     upper       Past Surgical History:   Past Surgical History:   Procedure Laterality Date   • APPENDECTOMY     • COLONOSCOPY      screening   • ENDOSCOPY      with biopsy   • ENDOSCOPY N/A 8/2/2021    Procedure: ESOPHAGOGASTRODUODENOSCOPY WITH BIOPSIES AND CAUTERIZATION OF AVM AND PLACEMENT OF CLIP;  Surgeon: Kathryn Stewart MD;  Location: [facility] ENDOSCOPY;  Service: Gastroenterology;  Laterality: N/A;   • EXCISION Left     gangreen left foot   • EYE SURGERY      Cataract   • LAPAROSCOPIC TUBAL LIGATION     • PERIPHERAL ARTERIAL STENT GRAFT Bilateral 07/30/2021       Family History:   Family History   Problem Relation Age of Onset   • Lung cancer Father    • Alcohol abuse Father    • Cancer Father    • Liver cancer Sister    • Dementia Mother        Social History:   Social History     Socioeconomic History   • Marital status:      Spouse name: Not on file   • Number of children: Not on file   • Years of education: Not on file   • Highest education level: Not on file   Tobacco Use   • Smoking status: Current Every Day Smoker     Packs/day: 1.50     Years: 60.00     Pack years: 90.00   • Smokeless tobacco: Never Used   Vaping Use   • Vaping Use: Never used   Substance and Sexual Activity  "  • Alcohol use: No   • Drug use: No   • Sexual activity: Defer       Medications:     Current Outpatient Medications:   •  alendronate (FOSAMAX) 70 MG tablet, Take 70 mg by mouth Every 7 (Seven) Days., Disp: , Rfl:   •  amLODIPine (NORVASC) 2.5 MG tablet, 1 tablet Daily., Disp: , Rfl:   •  atorvastatin (LIPITOR) 80 MG tablet, Take 80 mg by mouth Daily., Disp: , Rfl:   •  busPIRone (BUSPAR) 10 MG tablet, Take 10 mg by mouth 3 (Three) Times a Day., Disp: , Rfl:   •  carbidopa-levodopa (SINEMET)  MG per tablet, Take 1 tablet by mouth Daily., Disp: , Rfl:   •  citalopram (CeleXA) 40 MG tablet, Take 1 tablet by mouth Daily., Disp: , Rfl:   •  clopidogrel (PLAVIX) 75 MG tablet, Take 75 mg by mouth Daily., Disp: , Rfl:   •  ferrous sulfate 325 (65 FE) MG tablet, Take 325 mg by mouth Every 14 (Fourteen) Days., Disp: , Rfl:   •  hydroxychloroquine (PLAQUENIL) 200 MG tablet, Take 200 mg by mouth 2 (Two) Times a Day., Disp: , Rfl:   •  pramipexole (MIRAPEX) 0.5 MG tablet, Take 1 tablet by mouth 2 (two) times a day., Disp: 60 tablet, Rfl: 2  •  ramipril (ALTACE) 5 MG capsule, Take 5 mg by mouth Daily., Disp: , Rfl:   •  TiZANidine (ZANAFLEX) 4 MG capsule, Take 4 mg by mouth Daily., Disp: , Rfl:   •  vitamin D (ERGOCALCIFEROL) 1.25 MG (02466 UT) capsule capsule, Take 50,000 Units by mouth Every 14 (Fourteen) Days., Disp: , Rfl:     Allergies:   Allergies   Allergen Reactions   • Penicillins Rash   • Sulfa Antibiotics Nausea And Vomiting       Objective     Physical Exam:  Vital Signs:   Vitals:    08/25/21 1113   BP: 130/70   BP Location: Right arm   Patient Position: Sitting   Cuff Size: Adult   Pulse: 85   Temp: 96.4 °F (35.8 °C)   SpO2: 95%   Weight: 76.1 kg (167 lb 12.8 oz)   Height: 64.5 cm (25.39\")   PainSc: 0-No pain     Body mass index is 182.95 kg/m².     Physical Exam  Vitals and nursing note reviewed.   Constitutional:       General: She is not in acute distress.     Appearance: Normal appearance. She is " well-developed. She is not diaphoretic.   HENT:      Head: Normocephalic and atraumatic.   Eyes:      Conjunctiva/sclera: Conjunctivae normal.      Pupils: Pupils are equal, round, and reactive to light.   Cardiovascular:      Rate and Rhythm: Normal rate and regular rhythm.      Heart sounds: Normal heart sounds. No murmur heard.   No friction rub. No gallop.    Pulmonary:      Effort: Pulmonary effort is normal. No respiratory distress.      Breath sounds: Normal breath sounds. No wheezing or rales.   Musculoskeletal:         General: Normal range of motion.   Skin:     General: Skin is warm and dry.      Findings: No rash.   Neurological:      Mental Status: She is alert and oriented to person, place, and time.      Coordination: Finger-Nose-Finger Test normal.      Gait: Gait is intact.   Psychiatric:         Mood and Affect: Mood normal.         Speech: Speech normal.         Behavior: Behavior normal.         Thought Content: Thought content normal.         Judgment: Judgment normal.      Comments: Normal facial expressions         Neurologic Exam     Mental Status   Oriented to person, place, and time.   Attention: normal. Concentration: normal.   Speech: speech is normal   Level of consciousness: alert  Knowledge: good.     Cranial Nerves   Cranial nerves II through XII intact.     CN II   Visual fields full to confrontation.     CN III, IV, VI   Pupils are equal, round, and reactive to light.  Right pupil: Size: 2 mm. Shape: regular. Reactivity: sluggish.   Left pupil: Size: 2 mm. Shape: regular. Reactivity: sluggish.   CN III: no CN III palsy  CN VI: no CN VI palsy  Nystagmus: none     CN V   Facial sensation intact.     CN VII   Facial expression full, symmetric.     CN VIII   CN VIII normal.     CN IX, X   CN IX normal.   CN X normal.     CN XI   CN XI normal.     CN XII   CN XII normal.     Motor Exam   Muscle bulk: normal  Overall muscle tone: normal    Strength   Right biceps: 5/5  Left biceps:  5/5  Right triceps: 5/5  Left triceps: 5/5  Right quadriceps: 5/5  Left quadriceps: 5/5  Right hamstrin/5  Left hamstrin/5No cogwheel rigidity.      Sensory Exam   Light touch normal.   Proprioception normal.     Gait, Coordination, and Reflexes     Gait  Gait: normal    Coordination   Finger to nose coordination: normal    Tremor   Resting tremor: absent  Intention tremor: present (medium velocity, medium frequency tremor to bilateral hands with outstretched arms)  Action tremor: absent    Reflexes   Reflexes 2+ except as noted. Imbalance. Able to go from sitting to standing position on first attempt without assistance. Able to complete a turn in 1-2 steps. Normal arm swing bilaterally. Low velocity axial tremor present and head deviation to the right.        Assessment / Plan      Assessment/Plan:   Diagnoses and all orders for this visit:    1. Tremor of both hands (Primary)  -     MRI Brain With & Without Contrast; Future    2. Benign head tremor  -     MRI Brain With & Without Contrast; Future    3. Poor balance  -     MRI Brain With & Without Contrast; Future    4. Idiopathic peripheral neuropathy  -     EMG & Nerve Conduction Test; Future    5. Restless legs syndrome (RLS)  -     pramipexole (MIRAPEX) 0.5 MG tablet; Take 1 tablet by mouth 2 (two) times a day.  Dispense: 60 tablet; Refill: 2    6. History of anemia    7. BMI 28.0-28.9,adult    *Education on Tremor and Fall prevention provided.   *NCV/EMG to be done by Dr. Gracia.   *Dose of Pramipexole increased to help with symptoms.    *Advised patient to use an assistive device to help prevent falls.     Follow Up:   Return in about 1 month (around 2021) for Recheck.    GEOVANNY Back, FNP-C  [facility]       Please note that portions of this note may have been completed with a voice recognition program. Efforts were made to edit the dictations, but occasionally words are mistranscribed.

## 2021-08-25 NOTE — PATIENT INSTRUCTIONS
Tremor  A tremor is trembling or shaking that you cannot control. Most tremors affect the hands or arms. Tremors can also affect the head, vocal cords, face, and other parts of the body. There are many types of tremors. Common types include:  · Essential tremor. These usually occur in people older than 40. It may run in families and can happen in otherwise healthy people.  · Resting tremor. These occur when the muscles are at rest, such as when your hands are resting in your lap. People with Parkinson's disease often have resting tremors.  · Postural tremor. These occur when you try to hold a pose, such as keeping your hands outstretched.  · Kinetic tremor. These occur during purposeful movement, such as trying to touch a finger to your nose.  · Task-specific tremor. These may occur when you perform certain tasks such as writing, speaking, or standing.  · Psychogenic tremor. These dramatically lessen or disappear when you are distracted. They can happen in people of all ages.  Some types of tremors have no known cause. Tremors can also be a symptom of nervous system problems (neurological disorders) that may occur with aging. Some tremors go away with treatment, while others do not.  Follow these instructions at home:  Lifestyle         · Limit alcohol intake to no more than 1 drink a day for nonpregnant women and 2 drinks a day for men. One drink equals 12 oz of beer, 5 oz of wine, or 1½ oz of hard liquor.  · Do not use any products that contain nicotine or tobacco, such as cigarettes and e-cigarettes. If you need help quitting, ask your health care provider.  · Avoid extreme heat and extreme cold.  · Limit your caffeine intake, as told by your health care provider.  · Try to get 8 hours of sleep each night.  · Find ways to manage your stress, such as meditation or yoga.  General instructions  · Take over-the-counter and prescription medicines only as told by your health care provider.  · Keep all follow-up visits  as told by your health care provider. This is important.  Contact a health care provider if you:  · Develop a tremor after starting a new medicine.  · Have a tremor along with other symptoms such as:  ? Numbness.  ? Tingling.  ? Pain.  ? Weakness.  · Notice that your tremor gets worse.  · Notice that your tremor interferes with your day-to-day life.  Summary  · A tremor is trembling or shaking that you cannot control.  · Most tremors affect the hands or arms.  · Some types of tremors have no known cause. Others may be a symptom of nervous system problems (neurological disorders).  · Make sure you discuss any tremors you have with your health care provider.  This information is not intended to replace advice given to you by your health care provider. Make sure you discuss any questions you have with your health care provider.  Document Revised: 11/30/2018 Document Reviewed: 10/18/2018  [employer] Patient Education © 2021 [employer]  Fall Prevention in the Home, Adult  Falls can cause injuries. They can happen to people of all ages. There are many things you can do to make your home safe and to help prevent falls. Ask for help when making these changes, if needed.  What actions can I take to prevent falls?  General Instructions  · Use good lighting in all rooms. Replace any light bulbs that burn out.  · Turn on the lights when you go into a dark area. Use night-lights.  · Keep items that you use often in easy-to-reach places. Lower the shelves around your home if necessary.  · Set up your furniture so you have a clear path. Avoid moving your furniture around.  · Do not have throw rugs and other things on the floor that can make you trip.  · Avoid walking on wet floors.  · If any of your floors are uneven, fix them.  · Add color or contrast paint or tape to clearly pedro and help you see:  ? Any grab bars or handrails.  ? First and last steps of stairways.  ? Where the edge of each step is.  · If you use a  stepladder:  ? Make sure that it is fully opened. Do not climb a closed stepladder.  ? Make sure that both sides of the stepladder are locked into place.  ? Ask someone to hold the stepladder for you while you use it.  · If there are any pets around you, be aware of where they are.  What can I do in the bathroom?         · Keep the floor dry. Clean up any water that spills onto the floor as soon as it happens.  · Remove soap buildup in the tub or shower regularly.  · Use non-skid mats or decals on the floor of the tub or shower.  · Attach bath mats securely with double-sided, non-slip rug tape.  · If you need to sit down in the shower, use a plastic, non-slip stool.  · Install grab bars by the toilet and in the tub and shower. Do not use towel bars as grab bars.  What can I do in the bedroom?  · Make sure that you have a light by your bed that is easy to reach.  · Do not use any sheets or blankets that are too big for your bed. They should not hang down onto the floor.  · Have a firm chair that has side arms. You can use this for support while you get dressed.  What can I do in the kitchen?  · Clean up any spills right away.  · If you need to reach something above you, use a strong step stool that has a grab bar.  · Keep electrical cords out of the way.  · Do not use floor polish or wax that makes floors slippery. If you must use wax, use non-skid floor wax.  What can I do with my stairs?  · Do not leave any items on the stairs.  · Make sure that you have a light switch at the top of the stairs and the bottom of the stairs. If you do not have them, ask someone to add them for you.  · Make sure that there are handrails on both sides of the stairs, and use them. Fix handrails that are broken or loose. Make sure that handrails are as long as the stairways.  · Install non-slip stair treads on all stairs in your home.  · Avoid having throw rugs at the top or bottom of the stairs. If you do have throw rugs, attach them to  the floor with carpet tape.  · Choose a carpet that does not hide the edge of the steps on the stairway.  · Check any carpeting to make sure that it is firmly attached to the stairs. Fix any carpet that is loose or worn.  What can I do on the outside of my home?  · Use bright outdoor lighting.  · Regularly fix the edges of walkways and driveways and fix any cracks.  · Remove anything that might make you trip as you walk through a door, such as a raised step or threshold.  · Trim any bushes or trees on the path to your home.  · Regularly check to see if handrails are loose or broken. Make sure that both sides of any steps have handrails.  · Install guardrails along the edges of any raised decks and porches.  · Clear walking paths of anything that might make someone trip, such as tools or rocks.  · Have any leaves, snow, or ice cleared regularly.  · Use sand or salt on walking paths during winter.  · Clean up any spills in your garage right away. This includes grease or oil spills.  What other actions can I take?  · Wear shoes that:  ? Have a low heel. Do not wear high heels.  ? Have rubber bottoms.  ? Are comfortable and fit you well.  ? Are closed at the toe. Do not wear open-toe sandals.  · Use tools that help you move around (mobility aids) if they are needed. These include:  ? Canes.  ? Walkers.  ? Scooters.  ? Crutches.  · Review your medicines with your doctor. Some medicines can make you feel dizzy. This can increase your chance of falling.  Ask your doctor what other things you can do to help prevent falls.  Where to find more information  · Centers for Disease Control and Prevention, [place]: https://cdc.gov  · National [place] on Aging: https://iy4xtyx.katerin.nih.gov  Contact a doctor if:  · You are afraid of falling at home.  · You feel weak, drowsy, or dizzy at home.  · You fall at home.  Summary  · There are many simple things that you can do to make your home safe and to help prevent falls.  · Ways to  make your home safe include removing tripping hazards and installing grab bars in the bathroom.  · Ask for help when making these changes in your home.  This information is not intended to replace advice given to you by your health care provider. Make sure you discuss any questions you have with your health care provider.  Document Revised: 04/09/2020 Document Reviewed: 08/02/2018  [employer] Patient Education © 2021 [employer]

## 2021-09-01 ENCOUNTER — HOSPITAL ENCOUNTER (OUTPATIENT)
Facility: HOSPITAL | Age: 76
Setting detail: HOSPITAL OUTPATIENT SURGERY
Discharge: HOME | End: 2021-09-01
Attending: INTERNAL MEDICINE | Admitting: INTERNAL MEDICINE

## 2021-09-01 ENCOUNTER — ANESTHESIA (OUTPATIENT)
Dept: GASTROENTEROLOGY | Facility: HOSPITAL | Age: 76
End: 2021-09-01

## 2021-09-01 ENCOUNTER — ANESTHESIA EVENT (OUTPATIENT)
Dept: GASTROENTEROLOGY | Facility: HOSPITAL | Age: 76
End: 2021-09-01

## 2021-09-01 VITALS
RESPIRATION RATE: 18 BRPM | WEIGHT: 167 LBS | HEART RATE: 70 BPM | OXYGEN SATURATION: 92 % | SYSTOLIC BLOOD PRESSURE: 106 MMHG | DIASTOLIC BLOOD PRESSURE: 80 MMHG | BODY MASS INDEX: 27.82 KG/M2 | TEMPERATURE: 97.3 F | HEIGHT: 65 IN

## 2021-09-01 DIAGNOSIS — Z86.010 PERSONAL HISTORY OF COLONIC POLYPS: ICD-10-CM

## 2021-09-01 DIAGNOSIS — D50.0 IRON DEFICIENCY ANEMIA DUE TO CHRONIC BLOOD LOSS: ICD-10-CM

## 2021-09-01 DIAGNOSIS — K55.20 ANGIODYSPLASIA OF COLON: ICD-10-CM

## 2021-09-01 PROCEDURE — 45385 COLONOSCOPY W/LESION REMOVAL: CPT | Performed by: INTERNAL MEDICINE

## 2021-09-01 PROCEDURE — 25010000002 PROPOFOL 200 MG/20ML EMULSION: Performed by: NURSE ANESTHETIST, CERTIFIED REGISTERED

## 2021-09-01 PROCEDURE — 45380 COLONOSCOPY AND BIOPSY: CPT | Performed by: INTERNAL MEDICINE

## 2021-09-01 PROCEDURE — 88305 TISSUE EXAM BY PATHOLOGIST: CPT | Performed by: INTERNAL MEDICINE

## 2021-09-01 RX ORDER — SODIUM CHLORIDE 9 MG/ML
100 INJECTION, SOLUTION INTRAVENOUS CONTINUOUS
Status: DISCONTINUED | OUTPATIENT
Start: 2021-09-01 | End: 2021-09-01 | Stop reason: HOSPADM

## 2021-09-01 RX ORDER — PROPOFOL 10 MG/ML
INJECTION, EMULSION INTRAVENOUS AS NEEDED
Status: DISCONTINUED | OUTPATIENT
Start: 2021-09-01 | End: 2021-09-01 | Stop reason: SURG

## 2021-09-01 RX ORDER — LIDOCAINE HYDROCHLORIDE 20 MG/ML
INJECTION, SOLUTION INTRAVENOUS AS NEEDED
Status: DISCONTINUED | OUTPATIENT
Start: 2021-09-01 | End: 2021-09-01 | Stop reason: SURG

## 2021-09-01 RX ADMIN — PROPOFOL 100 MG: 10 INJECTION, EMULSION INTRAVENOUS at 12:42

## 2021-09-01 RX ADMIN — PROPOFOL 100 MG: 10 INJECTION, EMULSION INTRAVENOUS at 12:11

## 2021-09-01 RX ADMIN — PROPOFOL 100 MG: 10 INJECTION, EMULSION INTRAVENOUS at 12:17

## 2021-09-01 RX ADMIN — PROPOFOL 100 MG: 10 INJECTION, EMULSION INTRAVENOUS at 12:06

## 2021-09-01 RX ADMIN — PROPOFOL 100 MG: 10 INJECTION, EMULSION INTRAVENOUS at 12:35

## 2021-09-01 RX ADMIN — SODIUM CHLORIDE 100 ML/HR: 9 INJECTION, SOLUTION INTRAVENOUS at 10:57

## 2021-09-01 RX ADMIN — PROPOFOL 100 MG: 10 INJECTION, EMULSION INTRAVENOUS at 12:23

## 2021-09-01 RX ADMIN — LIDOCAINE HYDROCHLORIDE 60 MG: 20 INJECTION, SOLUTION INTRAVENOUS at 12:06

## 2021-09-01 NOTE — H&P
[facility]  HISTORY AND PHYSICAL    Patient Name: Padmini Leonard  : 1945  MRN: [id number]    Chief Complaint:   For surveillance colonoscopy    History Of Presenting Illness:    Personal history of colon polyp and history of advanced polyps removed  Iron deficiency anemia  Angiodysplasia of the colon  History of colitis    Past Medical History:   Diagnosis Date   • Anxiety    • Arthritis    • Asthma    • Backache     H/o   • Bell palsy    • Bell's palsy    • Blood disorder    • Cataracts, bilateral    • CHF (congestive heart failure) (CMS/HCC)    • COPD (chronic obstructive pulmonary disease) (CMS/HCC)     emphysema   • Disease of thyroid gland    • Diverticulosis 2021   • Elevated cholesterol    • Fibromyalgia    • Fractures    • Gait abnormality    • GERD (gastroesophageal reflux disease)    • H/O renal calculi    • History of blood transfusion    • Hx of echocardiogram     Ilinoise   • Hyperlipidemia    • Hypertension    • Ischemic ulcer of left foot (CMS/HCC)     · Ischemic left foot. 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 Unfortunately  the patient continues to smoke. Long discussion was undertaken with the patient. She had accepteda request ti give a serious consideration to quit smoking   • Lupus (CMS/HCC)    • Lupus (CMS/HCC)    • Neuropathy 2021    bilateral LLE worse on left   • Peptic ulcer     H/o   • Peripheral vascular disease (CMS/HCC)    • Piercing 2021    ears only   • Shingles    • Tattoo     x1   • Wears partial dentures     upper       Past Surgical History:   Procedure Laterality Date   • APPENDECTOMY     • COLONOSCOPY      screening   • ENDOSCOPY      with biopsy   • ENDOSCOPY N/A 2021    Procedure: ESOPHAGOGASTRODUODENOSCOPY WITH BIOPSIES AND CAUTERIZATION OF AVM AND PLACEMENT OF CLIP;  Surgeon: Kathryn Stewart MD;  Location: [facility] ENDOSCOPY;  Service: Gastroenterology;  Laterality: N/A;   • EXCISION Left     gangreen  left foot   • EYE SURGERY      Cataract   • LAPAROSCOPIC TUBAL LIGATION     • PERIPHERAL ARTERIAL STENT GRAFT Bilateral 07/30/2021       Social History     Socioeconomic History   • Marital status:      Spouse name: Not on file   • Number of children: Not on file   • Years of education: Not on file   • Highest education level: Not on file   Tobacco Use   • Smoking status: Current Every Day Smoker     Packs/day: 1.50     Years: 60.00     Pack years: 90.00   • Smokeless tobacco: Never Used   Vaping Use   • Vaping Use: Never used   Substance and Sexual Activity   • Alcohol use: No   • Drug use: No   • Sexual activity: Defer       Family History   Problem Relation Age of Onset   • Lung cancer Father    • Alcohol abuse Father    • Cancer Father    • Liver cancer Sister    • Dementia Mother        Prior to Admission Medications:  Medications Prior to Admission   Medication Sig Dispense Refill Last Dose   • alendronate (FOSAMAX) 70 MG tablet Take 70 mg by mouth Every 7 (Seven) Days.   8/31/2021 at 2145   • amLODIPine (NORVASC) 2.5 MG tablet 1 tablet Daily.   9/1/2021 at 0820   • atorvastatin (LIPITOR) 80 MG tablet Take 80 mg by mouth Daily.   8/31/2021 at 2145   • busPIRone (BUSPAR) 10 MG tablet Take 10 mg by mouth 3 (Three) Times a Day.   9/1/2021 at 0820   • carbidopa-levodopa (SINEMET)  MG per tablet Take 1 tablet by mouth 3 (Three) Times a Day.   9/1/2021 at 0820   • citalopram (CeleXA) 40 MG tablet Take 1 tablet by mouth Daily.   9/1/2021 at 0820   • clopidogrel (PLAVIX) 75 MG tablet Take 75 mg by mouth Daily.   8/27/2021   • ferrous sulfate 325 (65 FE) MG tablet Take 325 mg by mouth Every 14 (Fourteen) Days.   Past Week at Unknown time   • hydroxychloroquine (PLAQUENIL) 200 MG tablet Take 200 mg by mouth 2 (Two) Times a Day.   9/1/2021 at 0820   • pramipexole (MIRAPEX) 0.5 MG tablet Take 1 tablet by mouth 2 (two) times a day. 60 tablet 2 8/31/2021 at 2030   • ramipril (ALTACE) 5 MG capsule Take 5 mg by  mouth Daily.   8/31/2021 at 2145   • TiZANidine (ZANAFLEX) 4 MG capsule Take 4 mg by mouth Daily.   8/31/2021 at 2145   • vitamin D (ERGOCALCIFEROL) 1.25 MG (11119 UT) capsule capsule Take 50,000 Units by mouth Every 14 (Fourteen) Days.   Past Week at Unknown time       Allergies:  Allergies   Allergen Reactions   • Penicillins Rash   • Sulfa Antibiotics Nausea And Vomiting        Vitals: Temp:  [97.6 °F (36.4 °C)] 97.6 °F (36.4 °C)  Heart Rate:  [83] 83  Resp:  [18] 18  BP: (126)/(72) 126/72    Review Of Systems:  Constitutional:  Negative for chills, fever, and unexpected weight change.  Respiratory:  Negative for cough, chest tightness, shortness of breath, and wheezing.  Cardiovascular:  Negative for chest pain, palpitations, and leg swelling.  Gastrointestinal:  Negative for abdominal distention, abdominal pain, Nausea, vomiting.  Neurological:  Negative for Weakness, numbness, and headaches.     Physical Exam:    General Appearance:  Alert, cooperative, in no acute distress.   Lungs:   Clear to auscultation, respirations regular, even and                 unlabored.   Heart:  Regular rhythm and normal rate.   Abdomen:   Normal bowel sounds, no masses, no organomegaly. Soft, non-tender, non-distended   Neurologic: Alert and oriented x 3. Moves all four limbs equally       Plan: COLONOSCOPY (N/A)     Kathryn Stewart MD  9/1/2021

## 2021-09-01 NOTE — ANESTHESIA POSTPROCEDURE EVALUATION
Patient: Padmini Leonard    Procedure Summary     Date: 09/01/21 Room / Location: [facility] ENDOSCOPY 2 / [facility] ENDOSCOPY    Anesthesia Start: 1205 Anesthesia Stop: 1259    Procedure: COLONOSCOPY WITH BIOPSY AND POLYPECTOMY (N/A Anus) Diagnosis:       Iron deficiency anemia due to chronic blood loss      Angiodysplasia of colon      Personal history of colonic polyps      (Iron deficiency anemia due to chronic blood loss [D50.0])      (Angiodysplasia of colon [K55.20])      (Personal history of colonic polyps [Z86.010])    Surgeons: Kathryn Stewart MD Provider: Anish Torre CRNA    Anesthesia Type: MAC ASA Status: 3          Anesthesia Type: MAC    Vitals  Vitals Value Taken Time   /80 09/01/21 1342   Temp 97.3 °F (36.3 °C) 09/01/21 1312   Pulse 70 09/01/21 1342   Resp 18 09/01/21 1342   SpO2 92 % 09/01/21 1342           Post Anesthesia Care and Evaluation    Patient location during evaluation: bedside  Patient participation: complete - patient participated  Level of consciousness: awake  Pain score: 0  Pain management: adequate  Airway patency: patent  Anesthetic complications: No anesthetic complications  PONV Status: controlled  Cardiovascular status: acceptable and stable  Respiratory status: acceptable and room air  Hydration status: acceptable

## 2021-09-01 NOTE — ANESTHESIA PREPROCEDURE EVALUATION
Anesthesia Evaluation     Patient summary reviewed and Nursing notes reviewed   no history of anesthetic complications:  NPO Solid Status: > 8 hours  NPO Liquid Status: > 8 hours           Airway   Mallampati: I  TM distance: >3 FB  Neck ROM: full  no difficulty expected  Dental - normal exam     Pulmonary - normal exam   (+) COPD, asthma,  Cardiovascular - normal exam    (+) hypertension, CHF , PVD, hyperlipidemia,       Neuro/Psych  (+) numbness,     GI/Hepatic/Renal/Endo    (+)  GERD, PUD,      Musculoskeletal     Abdominal    Substance History - negative use     OB/GYN negative ob/gyn ROS         Other   arthritis,                      Anesthesia Plan    ASA 3     MAC     intravenous induction     Anesthetic plan, all risks, benefits, and alternatives have been provided, discussed and informed consent has been obtained with: patient.

## 2021-09-01 NOTE — DISCHARGE INSTRUCTIONS
Rest today  No pushing,pulling,tugging,heavy lifting, or strenuous activity   No major decision making,driving,or drinking alcoholic beverages for 24 hours due to the sedation you received  Always use good hand hygiene/washing technique  No driving on pain medication.    To assist you in voiding:  Drink plenty of fluids  Listen to running water while attempting to void.    If you are unable to urinate and you have an uncomfortable urge to void or it has been   6 hours since you were discharged, return to the Emergency Room       -Discharge patient to home (ambulatory).   - High fiber diet.   - Continue present medications.   - Await pathology results.   - Hold plavix for 5 days   - Repeat colonoscopy in 1 year for surveillance due to more than 10 large polyps removed.   - Will get [facility] bowel pillcam for further evaluatuion of anemia  - Return to GI office in 6 weeks.

## 2021-09-03 LAB — LAB AP CASE REPORT: NORMAL

## 2021-09-20 ENCOUNTER — OFFICE VISIT (OUTPATIENT)
Dept: GASTROENTEROLOGY | Facility: CLINIC | Age: 76
End: 2021-09-20

## 2021-09-20 ENCOUNTER — LAB (OUTPATIENT)
Dept: LAB | Facility: HOSPITAL | Age: 76
End: 2021-09-20

## 2021-09-20 VITALS
WEIGHT: 169 LBS | DIASTOLIC BLOOD PRESSURE: 66 MMHG | RESPIRATION RATE: 16 BRPM | HEIGHT: 65 IN | SYSTOLIC BLOOD PRESSURE: 146 MMHG | TEMPERATURE: 97.8 F | BODY MASS INDEX: 28.16 KG/M2 | HEART RATE: 84 BPM

## 2021-09-20 DIAGNOSIS — K55.20 ANGIODYSPLASIA OF COLON: ICD-10-CM

## 2021-09-20 DIAGNOSIS — D50.0 IRON DEFICIENCY ANEMIA DUE TO CHRONIC BLOOD LOSS: ICD-10-CM

## 2021-09-20 DIAGNOSIS — K63.5 POLYP OF COLON, UNSPECIFIED PART OF COLON, UNSPECIFIED TYPE: ICD-10-CM

## 2021-09-20 DIAGNOSIS — K31.89 EROSIVE GASTROPATHY: ICD-10-CM

## 2021-09-20 DIAGNOSIS — K21.9 GASTROESOPHAGEAL REFLUX DISEASE WITHOUT ESOPHAGITIS: ICD-10-CM

## 2021-09-20 DIAGNOSIS — D50.0 IRON DEFICIENCY ANEMIA DUE TO CHRONIC BLOOD LOSS: Primary | ICD-10-CM

## 2021-09-20 DIAGNOSIS — R13.10 DYSPHAGIA, UNSPECIFIED TYPE: ICD-10-CM

## 2021-09-20 DIAGNOSIS — K31.819 AVM (ARTERIOVENOUS MALFORMATION) OF STOMACH, ACQUIRED: ICD-10-CM

## 2021-09-20 PROCEDURE — 85027 COMPLETE CBC AUTOMATED: CPT

## 2021-09-20 PROCEDURE — 36415 COLL VENOUS BLD VENIPUNCTURE: CPT

## 2021-09-20 PROCEDURE — 99214 OFFICE O/P EST MOD 30 MIN: CPT | Performed by: NURSE PRACTITIONER

## 2021-09-20 PROCEDURE — 82728 ASSAY OF FERRITIN: CPT

## 2021-09-20 PROCEDURE — 83540 ASSAY OF IRON: CPT

## 2021-09-20 PROCEDURE — 84466 ASSAY OF TRANSFERRIN: CPT

## 2021-09-20 RX ORDER — PANTOPRAZOLE SODIUM 40 MG/1
TABLET, DELAYED RELEASE ORAL
Qty: 30 TABLET | Refills: 0 | Status: SHIPPED | OUTPATIENT
Start: 2021-09-20 | End: 2022-01-24

## 2021-09-20 RX ORDER — PANTOPRAZOLE SODIUM 40 MG/1
TABLET, DELAYED RELEASE ORAL
Qty: 90 TABLET | Refills: 1 | Status: SHIPPED | OUTPATIENT
Start: 2021-09-20 | End: 2021-11-24 | Stop reason: SDUPTHER

## 2021-09-20 NOTE — PROGRESS NOTES
Follow Up Note     Date: 2021   Patient Name: Padmini Leonard  MRN: [id number]  : 1945     Primary Care Provider: Sarah Frost DO     Chief Complaint   Patient presents with   • Follow-up     History of present illness:   2021  Padmini Leonard is a 76 y.o. female who is here today for follow up after procedures.    She is here for follow up of anemia. She did not take Pantoprazole after EGD as she was not aware she was started on any new medications. The patient denies abdominal pain, nausea or vomiting. The patient denies GI bleeding. No hematemesis, hematochezia or melena.  There is no history of reflux or difficulty swallowing.     Interval History:  2021  Patient has a chronic anemia at least for the past 8 years now.  Her hemoglobin as per record dropped to 6 once in .  Her hemoglobin was 9.3 in May 2021 and improved to 11.6 after iron pills.. Iron studies revealed normal iron and ferritin with iron saturation 72%.  This was done mostly with iron pills.  Her folic acid and the vitamin B12 level was normal in May 2021.  Platelets were normal liver enzymes were normal except borderline elevated alkaline phosphatase.  Patient is currently on Plavix. He has ongoing reflux symptoms and symptoms controlled with omeprazole 40mg po daily. She will get occasional rt lower abdomen.      This patient deny any change in bowel habit, hematochezia or melena.  Weight is stable. Pt denies nausea vomiting or odynophagia. She will have choking at throat with solid and liquids since about a year.  Prior history of EGD over ten yrs and last colonoscopy was in , inflammation found at [facility] , biopsy from the ascending colon path revealed chronic colitis. Pre was poor, few left colon polyps noted that was nott removed. As per record she had multiple polyps removed including advanced polyp with EMR before thant. She had rt colon AVM which was ablated. No family history of colon cancer or  any GI malignancy. No history of any abdominal surgery. Denies alcohol abuse or cigarette smoking.      She has lupus/ RA on plaquanil     7/22/2015  The patient came back for follow visit today. The patient is doing reasonably well from GI point of view. She denies nausea vomiting. There is no abdominal pain. She denies reflux. There is no dysphagia or odynophagia. There is no history of overt GI bleed (hematemesis melena or hematochezia). She denies recent weight loss. She has good appetite. There is no liver or pancreatic disease.As you recall the patient has been hospitalized recently in June 2015 with history of left lower extremity ischemia. She was found to be significantly anemic as well.     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 Furthermore the patient underwent a colonoscopy. She was found to have some polypoid areas which were not removed in anticipation of potential vascular intervention necessitating anticoagulation. The patient was noted to have a small ulceration at the ileocecal valve biopsies of which revealed mucosal excoriation with associated chronic colitis. Currently the patient feels okay. She still has left ischemic foot. Unfortunately the patient continues to smoke. The patient is smoking perhaps about a pack a day.    Subjective      Past Medical History:   Diagnosis Date   • Anxiety    • Arthritis    • Asthma    • Backache     H/o   • Bell palsy    • Bell's palsy    • Blood disorder    • Cataracts, bilateral    • CHF (congestive heart failure) (CMS/HCC)    • COPD (chronic obstructive pulmonary disease) (CMS/HCC)     emphysema   • Disease of thyroid gland    • Diverticulosis 07/30/2021   • Elevated cholesterol    • Fibromyalgia    • Fractures    • Gait abnormality    • GERD (gastroesophageal reflux disease)    • H/O renal calculi    • History of blood transfusion    • Hx of echocardiogram      Ilinoise   • Hyperlipidemia    • Hypertension    • Ischemic ulcer of left foot (CMS/HCC)     · Ischemic left foot. 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 Unfortunately  the patient continues to smoke. Long discussion was undertaken with the patient. She had accepteda request ti give a serious consideration to quit smoking   • Lupus (CMS/HCC)    • Lupus (CMS/HCC)    • Neuropathy 07/30/2021    bilateral LLE worse on left   • Peptic ulcer     H/o   • Peripheral vascular disease (CMS/HCC)    • Piercing 07/30/2021    ears only   • Shingles    • Tattoo     x1   • Wears partial dentures     upper     Past Surgical History:   Procedure Laterality Date   • APPENDECTOMY     • COLONOSCOPY      screening   • COLONOSCOPY N/A 9/1/2021    Procedure: COLONOSCOPY WITH BIOPSY AND POLYPECTOMY;  Surgeon: Kathryn Stewart MD;  Location: [facility] ENDOSCOPY;  Service: Gastroenterology;  Laterality: N/A;   • ENDOSCOPY      with biopsy   • ENDOSCOPY N/A 8/2/2021    Procedure: ESOPHAGOGASTRODUODENOSCOPY WITH BIOPSIES AND CAUTERIZATION OF AVM AND PLACEMENT OF CLIP;  Surgeon: Kathryn Stewart MD;  Location: [facility] ENDOSCOPY;  Service: Gastroenterology;  Laterality: N/A;   • EXCISION Left     gangreen left foot   • EYE SURGERY      Cataract   • LAPAROSCOPIC TUBAL LIGATION     • PERIPHERAL ARTERIAL STENT GRAFT Bilateral 07/30/2021     Family History   Problem Relation Age of Onset   • Lung cancer Father    • Alcohol abuse Father    • Cancer Father    • Liver cancer Sister    • Dementia Mother      Social History     Socioeconomic History   • Marital status:      Spouse name: Not on file   • Number of children: Not on file   • Years of education: Not on file   • Highest education level: Not on file   Tobacco Use   • Smoking status: Current Every Day Smoker     Packs/day: 1.50     Years: 60.00     Pack years: 90.00   • Smokeless tobacco: Never Used   Vaping Use   • Vaping Use: Never used   Substance and  Sexual Activity   • Alcohol use: No   • Drug use: No   • Sexual activity: Defer       Current Outpatient Medications:   •  alendronate (FOSAMAX) 70 MG tablet, Take 70 mg by mouth Every 7 (Seven) Days., Disp: , Rfl:   •  amLODIPine (NORVASC) 2.5 MG tablet, 1 tablet Daily., Disp: , Rfl:   •  atorvastatin (LIPITOR) 80 MG tablet, Take 80 mg by mouth Daily., Disp: , Rfl:   •  busPIRone (BUSPAR) 10 MG tablet, Take 10 mg by mouth 3 (Three) Times a Day., Disp: , Rfl:   •  carbidopa-levodopa (SINEMET)  MG per tablet, Take 1 tablet by mouth 3 (Three) Times a Day., Disp: , Rfl:   •  citalopram (CeleXA) 40 MG tablet, Take 1 tablet by mouth Daily., Disp: , Rfl:   •  clopidogrel (PLAVIX) 75 MG tablet, Take 75 mg by mouth Daily., Disp: , Rfl:   •  ferrous sulfate 325 (65 FE) MG tablet, Take 325 mg by mouth Every 14 (Fourteen) Days., Disp: , Rfl:   •  hydroxychloroquine (PLAQUENIL) 200 MG tablet, Take 200 mg by mouth 2 (Two) Times a Day., Disp: , Rfl:   •  pramipexole (MIRAPEX) 0.5 MG tablet, Take 1 tablet by mouth 2 (two) times a day., Disp: 60 tablet, Rfl: 2  •  ramipril (ALTACE) 5 MG capsule, Take 5 mg by mouth Daily., Disp: , Rfl:   •  TiZANidine (ZANAFLEX) 4 MG capsule, Take 4 mg by mouth Daily., Disp: , Rfl:   •  vitamin D (ERGOCALCIFEROL) 1.25 MG (49570 UT) capsule capsule, Take 50,000 Units by mouth Every 14 (Fourteen) Days., Disp: , Rfl:   •  pantoprazole (PROTONIX) 40 MG EC tablet, 1 po daily in the am 30 minutes before breakfast, Disp: 30 tablet, Rfl: 0  •  pantoprazole (PROTONIX) 40 MG EC tablet, 1 tablet by mouth daily in the am 30 minutes before breakfast., Disp: 90 tablet, Rfl: 1     Allergies   Allergen Reactions   • Penicillins Rash   • Sulfa Antibiotics Nausea And Vomiting     The following portions of the patient's history were reviewed and updated as appropriate: allergies, current medications, past family history, past medical history, past social history, past surgical history and problem  "list.  Objective     Physical Exam  Vitals and nursing note reviewed.   Constitutional:       General: She is not in acute distress.     Appearance: Normal appearance. She is well-developed.   HENT:      Head: Normocephalic and atraumatic.      Right Ear: Hearing normal.      Left Ear: Hearing normal.      Mouth/Throat:      Mouth: Mucous membranes are not pale, not dry and not cyanotic.   Eyes:      General: Lids are normal.      Conjunctiva/sclera: Conjunctivae normal.   Neck:      Trachea: Trachea normal.   Cardiovascular:      Rate and Rhythm: Normal rate and regular rhythm.      Heart sounds: Normal heart sounds.   Pulmonary:      Effort: Pulmonary effort is normal. No respiratory distress.      Breath sounds: Normal breath sounds.   Abdominal:      General: Bowel sounds are normal.      Palpations: Abdomen is soft. There is no mass.      Tenderness: There is no abdominal tenderness.      Hernia: No hernia is present.   Skin:     General: Skin is warm and dry.   Neurological:      Mental Status: She is alert and oriented to person, place, and time.   Psychiatric:         Mood and Affect: Mood normal.         Speech: Speech normal.         Behavior: Behavior normal. Behavior is cooperative.       Vitals:    09/20/21 1523   BP: 146/66   Pulse: 84   Resp: 16   Temp: 97.8 °F (36.6 °C)   Weight: 76.7 kg (169 lb)   Height: 163.8 cm (64.5\")     Body mass index is 28.56 kg/m².     Results Review:   I reviewed the patient's new clinical results.    Admission on 09/01/2021, Discharged on 09/01/2021   Component Date Value Ref Range Status   • Case Report 09/01/2021    Final                    Value:Surgical Pathology Report                         Case: [id number]                                  Authorizing Provider:  Kahtryn Stewart MD  Collected:           09/01/2021 12:15 PM          Ordering Location:     [facility]    Received:            09/01/2021 02:04 PM                                 SURG " ENDO                                                                    Pathologist:           Jaya Nava MD                                                       Specimens:   1) - Large Intestine, Cecum, cecum and ascending colon biopsy for history of  colitis               2) - Large Intestine, Sigmoid Colon, x7                                                             3) - Large Intestine, Left / Descending Colon, biopsy for history of colitis                        4) - Large Intestine, Rectum, x8                                                                    5) - Large Intestine, Rectum, biopsy for history of colitis                                                                   6) - Large Intestine, Sigmoid Colon, biopsy for history of colitis                                  7) - Small Intestine, Ileum, history of colitis                                                     8) - Large Intestine, Transverse Colon, biopsy for history of colitis                               9) - Large Intestine, Left / Descending Colon, proximal descending colon polyp x 4        Admission on 08/02/2021, Discharged on 08/02/2021   Component Date Value Ref Range Status   • COVID19 08/02/2021 Not Detected  Not Detected - Ref. Range Final   • Case Report 08/02/2021    Final                    Value:Surgical Pathology Report                         Case: [id number]                                  Authorizing Provider:  Kathryn Stewart MD  Collected:           08/02/2021 08:32 AM          Ordering Location:     [facility]    Received:            08/02/2021 09:02 AM                                 SURG ENDO                                                                    Pathologist:           Momo Brewer MD                                                     Specimens:   1) - Small Intestine, Duodenum, BX FOR IRON DEFICIENCY ANEMAI                                       2) - Gastric,  Antrum, ANTRUM AND BODY BX FOR H-PYLORI                                               3) - Esophagus, BX FOR DYSPHAGIA                                                          • Final Diagnosis 08/02/2021    Final                    Value:This result contains rich text formatting which cannot be displayed here.   Lab on 07/14/2021   Component Date Value Ref Range Status   • WBC 07/14/2021 7.81  3.40 - 10.80 10*3/mm3 Final   • RBC 07/14/2021 4.31  3.77 - 5.28 10*6/mm3 Final   • Hemoglobin 07/14/2021 12.6  12.0 - 15.9 g/dL Final   • Hematocrit 07/14/2021 39.8  34.0 - 46.6 % Final   • MCV 07/14/2021 92.3  79.0 - 97.0 fL Final   • MCH 07/14/2021 29.2  26.6 - 33.0 pg Final   • MCHC 07/14/2021 31.7  31.5 - 35.7 g/dL Final   • RDW 07/14/2021 16.7* 12.3 - 15.4 % Final   • RDW-SD 07/14/2021 57.7* 37.0 - 54.0 fl Final   • MPV 07/14/2021 10.7  6.0 - 12.0 fL Final   • Platelets 07/14/2021 247  140 - 450 10*3/mm3 Final   • Neutrophil % 07/14/2021 79.1* 42.7 - 76.0 % Final   • Lymphocyte % 07/14/2021 10.6* 19.6 - 45.3 % Final   • Monocyte % 07/14/2021 7.2  5.0 - 12.0 % Final   • Eosinophil % 07/14/2021 1.9  0.3 - 6.2 % Final   • Basophil % 07/14/2021 0.9  0.0 - 1.5 % Final   • Immature Grans % 07/14/2021 0.3  0.0 - 0.5 % Final   • Neutrophils, Absolute 07/14/2021 6.18  1.70 - 7.00 10*3/mm3 Final   • Lymphocytes, Absolute 07/14/2021 0.83  0.70 - 3.10 10*3/mm3 Final   • Monocytes, Absolute 07/14/2021 0.56  0.10 - 0.90 10*3/mm3 Final   • Eosinophils, Absolute 07/14/2021 0.15  0.00 - 0.40 10*3/mm3 Final   • Basophils, Absolute 07/14/2021 0.07  0.00 - 0.20 10*3/mm3 Final   • Immature Grans, Absolute 07/14/2021 0.02  0.00 - 0.05 10*3/mm3 Final   • nRBC 07/14/2021 0.0  0.0 - 0.2 /100 WBC Final   • Glucose 07/14/2021 94  65 - 99 mg/dL Final   • BUN 07/14/2021 16  8 - 23 mg/dL Final   • Creatinine 07/14/2021 0.88  0.57 - 1.00 mg/dL Final   • Sodium 07/14/2021 139  136 - 145 mmol/L Final   • Potassium 07/14/2021 4.5  3.5 - 5.2 mmol/L Final    • Chloride 07/14/2021 103  98 - 107 mmol/L Final   • CO2 07/14/2021 27.3  22.0 - 29.0 mmol/L Final   • Calcium 07/14/2021 9.0  8.6 - 10.5 mg/dL Final   • Total Protein 07/14/2021 6.5  6.0 - 8.5 g/dL Final   • Albumin 07/14/2021 4.10  3.50 - 5.20 g/dL Final   • ALT (SGPT) 07/14/2021 10  1 - 33 U/L Final   • AST (SGOT) 07/14/2021 13  1 - 32 U/L Final   • Alkaline Phosphatase 07/14/2021 127* 39 - 117 U/L Final   • Total Bilirubin 07/14/2021 0.2  0.0 - 1.2 mg/dL Final   • eGFR Non  Amer 07/14/2021 62  >60 mL/min/1.73 Final   • Globulin 07/14/2021 2.4  gm/dL Final   • A/G Ratio 07/14/2021 1.7  g/dL Final   • BUN/Creatinine Ratio 07/14/2021 18.2  7.0 - 25.0 Final   • Anion Gap 07/14/2021 8.7  5.0 - 15.0 mmol/L Final   • Iron 07/14/2021 44  37 - 145 mcg/dL Final   • Iron Saturation 07/14/2021 10* 20 - 50 % Final   • Transferrin 07/14/2021 291  200 - 360 mg/dL Final   • TIBC 07/14/2021 434  298 - 536 mcg/dL Final   • Ferritin 07/14/2021 13.90  13.00 - 150.00 ng/mL Final      CT Abdomen Pelvis With Contrast     Result Date: 7/30/2021  1. No evidence of metastatic disease. 2. No bowel wall thickening or inflammatory process 3. Stable left nephrolithiasis. This report was finalized on 7/30/2021 6:04 PM by Jaya Rogers MD.    EGD dated 8/2/2021  - Normal oropharynx.  - Z-line regular, 37 cm from the incisors.  - 2 cm hiatal hernia.  - No gross lesions in esophagus.  - No endoscopic esophageal abnormality to explain patient's dysphagia. Biopsied.  - Non-bleeding erosive gastropathy. Biopsied. This could be NSAID related  - Seven non-bleeding angioectasias in the stomach. Treated with argon plasma coagulation (APC). Clip (MR conditional) was placed.  - Normal duodenal bulb, first portion of the duodenum, second portion of the duodenum and third portion of the duodenum. Biopsied.  - She could bleed from AVM or from severe erosions  - Duodenum biopsy unremarkable.  Antrum and body biopsy with minimal chronic gastritis.   Esophagus biopsy unremarkable.    Colonoscopy dated 9/1/2021  - One 10 to 12 mm polyp in the proximal descending colon, removed with a hot snare. Resected and retrieved.  - Three 4 to 6 mm polyps in the proximal descending colon, removed with a cold snare. Resected and retrieved.  - Seven 4 to 8 mm polyps in the sigmoid colon, removed with a hot snare. Resected and retrieved.  - Multiple 4 to 12 mm polyps in the rectum and at the recto-sigmoid colon, removed with a hot snare. Resected and retrieved.  - Multiple small polyps left in rectum, distal sigmoid appears to be hyperplastic  - No endosocpic signs of colitis or ileitis  - The examined portion of the ileum was normal. Biopsied.  - Biopsies performed in the sigmoid colon, in the descending colon, in the transverse colon and in the ascending colon and cecum due to prior history of colitis.  - Small bowel biopsy unremarkable.  Random colon biopsies unremarkable.  Descending colon polyp proximal x4 biopsy with fragments of hyperplastic polyps.  Sigmoid colon polyp x7 biopsy with fragments of hyperplastic polyps.  Rectal polyp x8 biopsies with fragments of hyperplastic polyps.    Assessment / Plan      1. Iron deficiency anemia due to chronic blood loss  2. Erosive gastropathy  3. AVM (arteriovenous malformation) of stomach, acquired  4. Angiodysplasia of colon  Patient denies GI bleeding. Denies abdominal pain, nausea or change in bowel habits. Labs in July 2021 with no anemia, iron level normal at 44, decreased iron saturation at 10%. She is on oral iron supplements.  EGD with nonbleeding erosive gastropathy and nonbleeding angioectasias in the stomach treated with ACP. Clip placed. She was recommended Pantoprazole 40 mg daily, but she was not aware she was to be taking it and has not been on any PPI therapy.  Colonoscopy with polyps removed, random biopsies unremarkable, no evidence of IBD. CTAP with unremarkable GI tract.  Labs  Pantoprazole 40 mg daily  Avoid  NSAIDs.  Patient may not be a candidate for anticoagulation.  Capsule endoscopy in the future if continued anemia despite being on PPI.    - CBC (No Diff); Future  - Iron Profile; Future  - Ferritin; Future    7/14/2021  Patient has intermittent anemia for the past 7 - 8 years now.  Etiology is unclear however possible GI bleed is suspected.  She did have a work-up done for the anemia in 2015.  As per record her colonoscopy done revealed ascending colon inflammation ulceration and pathology consistent with chronic colitis.  She also had a right-sided colonic AVMs which were ablated.  Her prep was poor and few small colon polyps were not removed.  Unclear whether patient has any inflammatory bowel disease as she also has a lupus and rheumatoid arthritis.  However she does not have any current significant lower GI symptoms.     She needs an EGD and colonoscopy for further evaluation.  She did have a tortuous long colon as per record.  Patient also has some issues with the dysphagia.   We will schedule these 2 procedures separately.  We will continue her iron pills for now  CBC, CMP, iron studies now  We will also get a CT scan abdomen pelvis to rule out any solid organ pathology.  She may need small bowel PillCam study if EGD colonoscopy is normal    5. Gastroesophageal reflux disease without esophagitis  6. Dysphagia, unspecified type  Reflux is doing well. No problems swallowing at this time. She is not on PPI therapy at this time. She was prescribed Pantoprazole 40 mg daily after her EGD, but she was not aware and she has not been taking it. She is not on any medications for reflux at this time. CTAP of GI tract unremarkable. EGD with no endoscopic abnormality to explain patient's dysphagia. Esophagus biopsy unremarkable. Suspect esophageal dysmotility.  Anti-reflux measures.  Pantoprazole 40 mg daily   Esophagram in the future if difficulty swallowing returns.    7/14/2021  Patient appears to have some degree for  dysmotility .  I doubt she has any esophageal mechanical obstruction.    No significant reflux symptoms while on omeprazole 40 mg p.o. daily dose .  Her last EGD was over 10 years ago.  Will recommend on a PPI dosage when once the endoscopy is performed.    7. Polyp of colon, unspecified part of colon, unspecified type  Colonoscopy with multiple polyps removed, >10 large polyps. No family history of colon cancer.   Colonoscopy for surveillance in 1 year, 2022.     Patient Instructions   Antireflux measures: Avoid fried, fatty foods, alcohol, chocolate, coffee, tea,  soft drinks, peppermint and spearmint, spicy foods, tomatoes and tomato based foods, onion based foods, and smoking.   Other antireflux measures include weight reduction if overweight, avoiding tight clothing around the abdomen, elevating the head of the bed 6 inches with blocks under the head board, and don't drink or eat before going to bed and avoid lying down immediately after meals.   Pantoprazole 40 mg 1 po daily in the am 30 minutes before breakfast.   Recommended to take Levothyroxine first in the am upon waking, wait 30 minutes, then take Pantoprazole 40 mg, wait 30 minutes, then eat breakfast and take other medications.  Avoid NSAIDs (ibuprofen, Motrin, Advil, Aleve, Mobic, Naprosyn, etc)  CBC  Colonoscopy for surveillance in 1 year, 2022.   Follow up: 6 weeks    Shannon Chavez, APRN  9/20/2021    Please note that portions of this note may have been completed with a voice recognition program. Efforts were made to edit the dictations, but occasionally words are mistranscribed.

## 2021-09-20 NOTE — PATIENT INSTRUCTIONS
Antireflux measures: Avoid fried, fatty foods, alcohol, chocolate, coffee, tea,  soft drinks, peppermint and spearmint, spicy foods, tomatoes and tomato based foods, onion based foods, and smoking.   Other antireflux measures include weight reduction if overweight, avoiding tight clothing around the abdomen, elevating the head of the bed 6 inches with blocks under the head board, and don't drink or eat before going to bed and avoid lying down immediately after meals.   Pantoprazole 40 mg 1 po daily in the am 30 minutes before breakfast.   Recommended to take Levothyroxine first in the am upon waking, wait 30 minutes, then take Pantoprazole 40 mg, wait 30 minutes, then eat breakfast and take other medications.  Avoid NSAIDs (ibuprofen, Motrin, Advil, Aleve, Mobic, Naprosyn, etc)  CBC  Colonoscopy for surveillance in 1 year, 2022.   Follow up: 6 weeks

## 2021-09-21 LAB
DEPRECATED RDW RBC AUTO: 47.8 FL (ref 37–54)
ERYTHROCYTE [DISTWIDTH] IN BLOOD BY AUTOMATED COUNT: 13.9 % (ref 12.3–15.4)
FERRITIN SERPL-MCNC: 19.8 NG/ML (ref 13–150)
HCT VFR BLD AUTO: 37.9 % (ref 34–46.6)
HGB BLD-MCNC: 12.4 G/DL (ref 12–15.9)
IRON 24H UR-MRATE: 82 MCG/DL (ref 37–145)
IRON SATN MFR SERPL: 21 % (ref 20–50)
MCH RBC QN AUTO: 30.5 PG (ref 26.6–33)
MCHC RBC AUTO-ENTMCNC: 32.7 G/DL (ref 31.5–35.7)
MCV RBC AUTO: 93.3 FL (ref 79–97)
PLATELET # BLD AUTO: 225 10*3/MM3 (ref 140–450)
PMV BLD AUTO: 10.7 FL (ref 6–12)
RBC # BLD AUTO: 4.06 10*6/MM3 (ref 3.77–5.28)
TIBC SERPL-MCNC: 393 MCG/DL (ref 298–536)
TRANSFERRIN SERPL-MCNC: 264 MG/DL (ref 200–360)
WBC # BLD AUTO: 6.92 10*3/MM3 (ref 3.4–10.8)

## 2021-09-23 ENCOUNTER — HOSPITAL ENCOUNTER (OUTPATIENT)
Dept: MRI IMAGING | Facility: HOSPITAL | Age: 76
Discharge: HOME | End: 2021-09-23
Admitting: NURSE PRACTITIONER

## 2021-09-23 DIAGNOSIS — R25.1 TREMOR OF BOTH HANDS: ICD-10-CM

## 2021-09-23 DIAGNOSIS — R26.89 POOR BALANCE: ICD-10-CM

## 2021-09-23 DIAGNOSIS — G25.0 BENIGN HEAD TREMOR: ICD-10-CM

## 2021-09-23 PROCEDURE — 0 GADOBENATE DIMEGLUMINE 529 MG/ML SOLUTION: Performed by: NURSE PRACTITIONER

## 2021-09-23 PROCEDURE — A9577 INJ MULTIHANCE: HCPCS | Performed by: NURSE PRACTITIONER

## 2021-09-23 PROCEDURE — 70553 MRI BRAIN STEM W/O & W/DYE: CPT

## 2021-09-23 RX ADMIN — GADOBENATE DIMEGLUMINE 15 ML: 529 INJECTION, SOLUTION INTRAVENOUS at 10:17

## 2021-10-18 ENCOUNTER — PROCEDURE VISIT (OUTPATIENT)
Dept: NEUROLOGY | Facility: CLINIC | Age: 76
End: 2021-10-18

## 2021-10-18 VITALS — BODY MASS INDEX: 28.57 KG/M2 | HEIGHT: 64 IN

## 2021-10-18 DIAGNOSIS — G60.9 IDIOPATHIC PERIPHERAL NEUROPATHY: ICD-10-CM

## 2021-10-18 PROCEDURE — 95886 MUSC TEST DONE W/N TEST COMP: CPT | Performed by: PSYCHIATRY & NEUROLOGY

## 2021-10-18 PROCEDURE — 95911 NRV CNDJ TEST 9-10 STUDIES: CPT | Performed by: PSYCHIATRY & NEUROLOGY

## 2021-10-18 NOTE — PROGRESS NOTES
[facility]   Electrodiagnostic Laboratory    Nerve Conduction & EMG Report        Patient:  Padmini Leonard   Patient ID: [id number]   YOB: 1945  Sex:  female      Exam Physician:  Jhoan Gracia MD  Refer Physician:  Margaret Dailey*    Electromyogram and Nerve Conduction Velocity Procedure Note    Hx: 76 y.o. right handed female with complaint of pain involving the both lower extremities. Symptoms have been present for several months and were provoked by worse at night. Significant past medical history includes peripheral neuropathy. Medications include Mirapex. Family history no family history of nerve or muscle disease.    Exam: Motor power is normal. There is no atrophy. There are no fasciculations. Deep tendon reflexes are present and symmetrical. Sensory exam is normal.      Edx studies of the B LE were performed to evaluate for peripheral neuropathy.     NCS Examination   For sensory nerve conduction studies, the amplitude is measured peak-to-peak, the latency reported is the distal peak latency, and the conduction velocity, if measured, is determined from onset latencies and is over the forearm.   For motor nerve conduction studies, the amplitude is measured baseline-to-peak, the latency reported is the distal onset latency, the conduction velocity is calculated over the forearm, and the F wave latency is the minimum latency.   Unless otherwise noted, the hand temperature was monitored continuously and remained between 32°C and 36°C during the performance of the NCSs.        Sensory NCS      Nerve / Sites Rec. Site Onset Lat Peak Lat NP Amp PP Amp Segments Distance Velocity     ms ms µV µV  cm m/s   L Sural - Ankle (Calf)      Calf Ankle NR NR NR NR Calf - Ankle 14 NR   R Sural - Ankle (Calf)      Calf Ankle NR NR NR NR Calf - Ankle 14 NR   L Superficial peroneal - Ankle      Lat leg Ankle NR NR NR NR Lat leg - Ankle 14 NR   R Superficial peroneal - Ankle      Lat  leg Ankle NR NR NR NR Lat leg - Ankle 14 NR                 Motor NCS      Nerve / Sites Muscle Latency Amplitude Amp % Duration Segments Distance Lat Diff Velocity     ms mV % ms  cm ms m/s   L Peroneal - EDB      Ankle EDB 2.97 5.0 100 5.63 Ankle - EDB 8        Fib head EDB 11.98 3.6 71.8 5.94 Fib head - Ankle 34 9.01 38      Pop fossa EDB 13.49 3.6 72 6.46 Pop fossa - Fib head 8 1.51 53   R Peroneal - EDB      Ankle EDB 3.65 4.1 100 5.63 Ankle - EDB 8        Fib head EDB 11.46 4.0 98.8 6.56 Fib head - Ankle 30 7.81 38      Pop fossa EDB 13.44 2.0 49.1 5.99 Pop fossa - Fib head 9 1.98 45   L Tibial - AH      Ankle AH 3.91 8.1 100 5.26 Ankle - AH 8        Pop fossa AH 14.79 5.5 68.4 6.56 Pop fossa - Ankle 39 10.89 36   R Tibial - AH      Ankle AH 4.48 7.4 100 5.16 Ankle - AH 8        Pop fossa AH 14.38 3.6 48.3 5.73 Pop fossa - Ankle 39 9.90 39               F  Wave      Nerve F Lat M Lat F-M Lat    ms ms ms   L Peroneal - EDB 57.1 3.2 53.9   L Tibial - AH 61.7 4.3 57.4   R Tibial - AH 58.8 4.4 54.3   R Peroneal - EDB 59.9 3.2 56.7                   H Reflex      Nerve H Lat    ms   L Tibial - Soleus 35.0   R Tibial - Soleus 34.2               EMG Examination   The study was performed with a concentric needle electrode. Fibrillation and fasciculation activity is graded from none (0) to continuous (4+). The configuration and recruitment pattern of motor unit action potentials under voluntary control, if not normal, are described below         EMG Summary Table     Spontaneous MUAP Recruitment   Muscle Nerve Roots IA Fib PSW Fasc H.F. Amp Dur. PPP Pattern   L. Tibialis posterior Tibial L4-L5 1+ 1+ 1+ None None 1+ N N Reduced   R. Tibialis posterior Tibial L4-L5 1+ 1+ 1+ None None 1+ N N Reduced   L. Tibialis anterior Deep peroneal (Fibular) L4-L5 1+ 1+ 1+ None None 1+ N N Reduced   R. Tibialis anterior Deep peroneal (Fibular) L4-L5 1+ 1+ 1+ None None 1+ N N Reduced   L. Gastrocnemius (Lateral head) Tibial S1-S2 1+ 1+  1+ None None 1+ N N Reduced   R. Gastrocnemius (Lateral head) Tibial S1-S2 1+ 1+ 1+ None None 1+ N N Reduced   L. Gluteus andrea Inferior gluteal L5-S2 N None None None None N N N N   R. Gluteus andrea Inferior gluteal L5-S2 N None None None None N N N N   L. Iliopsoas Femoral L2-L3 N None None None None N N N N   R. Iliopsoas Femoral L2-L3 N None None None None N N N N   L. Biceps femoris (long head) Sciatic (tibial division) L5-S2 N None None None None N N N N   R. Biceps femoris (long head) Sciatic (tibial division) L5-S2 N None None None None N N N N       Summary    The motor conduction test was performed on 4 nerve(s). The results were normal in 1 nerve(s): R Tibial - AH. Results outside the specified normal range were found in 3 nerve(s), as follows:  • In the L Peroneal - EDB study  o the take off velocity result was reduced for Fib head - Ankle segment  • In the R Peroneal - EDB study  o the take off velocity result was reduced for Fib head - Ankle segment  • In the L Tibial - AH study  o the take off velocity result was reduced for Pop fossa - Ankle segment    The sensory conduction test had results outside of the specified normal range in all 4 of the tested nerves:  • In the L Sural - Ankle (Calf) study  o the response was considered absent for Calf stimulation  • In the R Sural - Ankle (Calf) study  o the response was considered absent for Calf stimulation  • In the L Superficial peroneal - Ankle study  o the response was considered absent for Lat leg stimulation  • In the R Superficial peroneal - Ankle study  o the response was considered absent for Lat leg stimulation    The F wave study was normal in all 4 of the tested nerves: L Peroneal - EDB, L Tibial - AH, R Tibial - AH, R Peroneal - EDB.    The H reflex study was normal in all 2 of the tested nerves: L Tibial - Soleus, R Tibial - Soleus.    The needle EMG examination was performed in 12 muscles. It was normal in 6 muscle(s): L. Gluteus  andrea, R. Gluteus andrea, L. Iliopsoas, R. Iliopsoas, L. Biceps femoris (long head), R. Biceps femoris (long head). The study was abnormal in 6 muscle(s), with the following distribution:  • Abnormal spontaneous/insertional activity was found in L. Tibialis posterior, R. Tibialis posterior, L. Tibialis anterior, R. Tibialis anterior, L. Gastrocnemius (Lateral head), R. Gastrocnemius (Lateral head).  • The MUP waveform abnormality was found in L. Tibialis posterior, R. Tibialis posterior, L. Tibialis anterior, R. Tibialis anterior, L. Gastrocnemius (Lateral head), R. Gastrocnemius (Lateral head).  • Abnormal interference pattern was found in L. Tibialis posterior, R. Tibialis posterior, L. Tibialis anterior, R. Tibialis anterior, L. Gastrocnemius (Lateral head), R. Gastrocnemius (Lateral head).        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          Instrument used:  T[employer] Synergy        Performed by:          Jhoan Gracia MD

## 2021-11-03 DIAGNOSIS — G25.81 RESTLESS LEGS SYNDROME (RLS): ICD-10-CM

## 2021-11-03 RX ORDER — PRAMIPEXOLE DIHYDROCHLORIDE 0.5 MG/1
TABLET ORAL
Qty: 180 TABLET | Refills: 1 | Status: SHIPPED | OUTPATIENT
Start: 2021-11-03 | End: 2022-04-21

## 2021-11-24 ENCOUNTER — OFFICE VISIT (OUTPATIENT)
Dept: GASTROENTEROLOGY | Facility: CLINIC | Age: 76
End: 2021-11-24

## 2021-11-24 VITALS
DIASTOLIC BLOOD PRESSURE: 77 MMHG | HEART RATE: 84 BPM | HEIGHT: 64 IN | BODY MASS INDEX: 30.05 KG/M2 | SYSTOLIC BLOOD PRESSURE: 162 MMHG | RESPIRATION RATE: 16 BRPM | TEMPERATURE: 99.1 F | WEIGHT: 176 LBS

## 2021-11-24 DIAGNOSIS — Z86.010 PERSONAL HISTORY OF COLONIC POLYPS: ICD-10-CM

## 2021-11-24 DIAGNOSIS — R13.10 DYSPHAGIA, UNSPECIFIED TYPE: ICD-10-CM

## 2021-11-24 DIAGNOSIS — K21.9 GASTROESOPHAGEAL REFLUX DISEASE WITHOUT ESOPHAGITIS: ICD-10-CM

## 2021-11-24 DIAGNOSIS — D50.0 IRON DEFICIENCY ANEMIA DUE TO CHRONIC BLOOD LOSS: Primary | ICD-10-CM

## 2021-11-24 PROBLEM — K63.3 COLON ULCER: Status: RESOLVED | Noted: 2017-09-08 | Resolved: 2021-11-24

## 2021-11-24 PROBLEM — E55.9 VITAMIN D DEFICIENCY: Status: ACTIVE | Noted: 2018-04-11

## 2021-11-24 PROCEDURE — 99214 OFFICE O/P EST MOD 30 MIN: CPT | Performed by: NURSE PRACTITIONER

## 2021-11-24 RX ORDER — LEVOTHYROXINE SODIUM 0.05 MG/1
50 TABLET ORAL DAILY
COMMUNITY

## 2021-11-24 RX ORDER — GABAPENTIN 300 MG/1
300 CAPSULE ORAL 3 TIMES DAILY
COMMUNITY
End: 2022-09-22

## 2021-11-24 NOTE — PATIENT INSTRUCTIONS
Antireflux measures: Avoid fried, fatty foods, alcohol, chocolate, coffee, tea,  soft drinks, peppermint and spearmint, spicy foods, tomatoes and tomato based foods, onion based foods, and smoking.   Other antireflux measures include weight reduction if overweight, avoiding tight clothing around the abdomen, elevating the head of the bed 6 inches with blocks under the head board, and don't drink or eat before going to bed and avoid lying down immediately after meals.   Pantoprazole 40 mg 1 po daily in the am 30 minutes before breakfast. The patient wishes to take as needed.   Labs  Colonoscopy in September 2022.   Follow up: July 2022 or sooner if needed

## 2021-11-24 NOTE — PROGRESS NOTES
Follow Up Note     Date: 2021   Patient Name: Padmini Leonard  MRN: [id number]  : 1945     Primary Care Provider: Sarah Frost DO     Chief Complaint   Patient presents with   • Follow-up   • Anemia     History of present illness:   2021  Padmini Leonard is a 76 y.o. female who is here today for follow up for anemia.    She has been taking the Pantoprazole daily since her last visit. Denies GI bleeding. Denies abdominal pain, nausea, or change in bowel habits.    Interval History:  2021  She is here for follow up of anemia. She did not take Pantoprazole after EGD as she was not aware she was started on any new medications. The patient denies abdominal pain, nausea or vomiting. The patient denies GI bleeding. No hematemesis, hematochezia or melena.  There is no history of reflux or difficulty swallowing.      2021  Patient has a chronic anemia at least for the past 8 years now.  Her hemoglobin as per record dropped to 6 once in .  Her hemoglobin was 9.3 in May 2021 and improved to 11.6 after iron pills.. Iron studies revealed normal iron and ferritin with iron saturation 72%.  This was done mostly with iron pills.  Her folic acid and the vitamin B12 level was normal in May 2021.  Platelets were normal liver enzymes were normal except borderline elevated alkaline phosphatase.  Patient is currently on Plavix. He has ongoing reflux symptoms and symptoms controlled with omeprazole 40mg po daily. She will get occasional rt lower abdomen.      This patient deny any change in bowel habit, hematochezia or melena.  Weight is stable. Pt denies nausea vomiting or odynophagia. She will have choking at throat with solid and liquids since about a year.  Prior history of EGD over ten yrs and last colonoscopy was in , inflammation found at [facility] , biopsy from the ascending colon path revealed chronic colitis. Pre was poor, few left colon polyps noted that was nott removed. As per  record she had multiple polyps removed including advanced polyp with EMR before thant. She had rt colon AVM which was ablated. No family history of colon cancer or any GI malignancy. No history of any abdominal surgery. Denies alcohol abuse or cigarette smoking.      She has lupus/ RA on plaquanil     7/22/2015  The patient came back for follow visit today. The patient is doing reasonably well from GI point of view. She denies nausea vomiting. There is no abdominal pain. She denies reflux. There is no dysphagia or odynophagia. There is no history of overt GI bleed (hematemesis melena or hematochezia). She denies recent weight loss. She has good appetite. There is no liver or pancreatic disease.As you recall the patient has been hospitalized recently in June 2015 with history of left lower extremity ischemia. She was found to be significantly anemic as well.      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 Furthermore the patient underwent a colonoscopy. She was found to have some polypoid areas which were not removed in anticipation of potential vascular intervention necessitating anticoagulation. The patient was noted to have a small ulceration at the ileocecal valve biopsies of which revealed mucosal excoriation with associated chronic colitis. Currently the patient feels okay. She still has left ischemic foot. Unfortunately the patient continues to smoke. The patient is smoking perhaps about a pack a day.    Subjective      Past Medical History:   Diagnosis Date   • Anxiety    • Arthritis    • Asthma    • Backache     H/o   • Bell palsy    • Bell's palsy    • Blood disorder    • Cataracts, bilateral    • CHF (congestive heart failure) (HCC)    • COPD (chronic obstructive pulmonary disease) (HCC)     emphysema   • Disease of thyroid gland    • Diverticulosis 07/30/2021   • Elevated cholesterol    • Fibromyalgia    •  Fractures    • Gait abnormality    • GERD (gastroesophageal reflux disease)    • H/O renal calculi    • History of blood transfusion    • Hx of echocardiogram     Ilinoise   • Hyperlipidemia    • Hypertension    • Ischemic ulcer of left foot (HCC)     · Ischemic left foot. 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 Unfortunately  the patient continues to smoke. Long discussion was undertaken with the patient. She had accepteda request ti give a serious consideration to quit smoking   • Lupus (HCC)    • Lupus (HCC)    • Neuropathy 07/30/2021    bilateral LLE worse on left   • Peptic ulcer     H/o   • Peripheral vascular disease (HCC)    • Piercing 07/30/2021    ears only   • Shingles    • Tattoo     x1   • Wears partial dentures     upper     Past Surgical History:   Procedure Laterality Date   • APPENDECTOMY     • COLONOSCOPY      screening   • COLONOSCOPY N/A 9/1/2021    Procedure: COLONOSCOPY WITH BIOPSY AND POLYPECTOMY;  Surgeon: Kathryn Stewart MD;  Location: [facility] ENDOSCOPY;  Service: Gastroenterology;  Laterality: N/A;   • ENDOSCOPY      with biopsy   • ENDOSCOPY N/A 8/2/2021    Procedure: ESOPHAGOGASTRODUODENOSCOPY WITH BIOPSIES AND CAUTERIZATION OF AVM AND PLACEMENT OF CLIP;  Surgeon: Kathryn Stewart MD;  Location: [facility] ENDOSCOPY;  Service: Gastroenterology;  Laterality: N/A;   • EXCISION Left     gangreen left foot   • EYE SURGERY      Cataract   • LAPAROSCOPIC TUBAL LIGATION     • PERIPHERAL ARTERIAL STENT GRAFT Bilateral 07/30/2021     Family History   Problem Relation Age of Onset   • Lung cancer Father    • Alcohol abuse Father    • Cancer Father    • Liver cancer Sister    • Dementia Mother    • Colon cancer Neg Hx      Social History     Socioeconomic History   • Marital status:    Tobacco Use   • Smoking status: Current Every Day Smoker     Packs/day: 1.50     Years: 60.00     Pack years: 90.00   • Smokeless tobacco: Never Used   Vaping Use   • Vaping Use: Never  used   Substance and Sexual Activity   • Alcohol use: No   • Drug use: No   • Sexual activity: Defer       Current Outpatient Medications:   •  alendronate (FOSAMAX) 70 MG tablet, Take 70 mg by mouth Every 7 (Seven) Days., Disp: , Rfl:   •  amLODIPine (NORVASC) 2.5 MG tablet, 1 tablet Daily., Disp: , Rfl:   •  atorvastatin (LIPITOR) 80 MG tablet, Take 80 mg by mouth Daily., Disp: , Rfl:   •  busPIRone (BUSPAR) 10 MG tablet, Take 10 mg by mouth 3 (Three) Times a Day., Disp: , Rfl:   •  carbidopa-levodopa (SINEMET)  MG per tablet, Take 1 tablet by mouth 3 (Three) Times a Day., Disp: , Rfl:   •  citalopram (CeleXA) 40 MG tablet, Take 1 tablet by mouth Daily., Disp: , Rfl:   •  clopidogrel (PLAVIX) 75 MG tablet, Take 75 mg by mouth Daily., Disp: , Rfl:   •  ferrous sulfate 325 (65 FE) MG tablet, Take 325 mg by mouth Every 14 (Fourteen) Days., Disp: , Rfl:   •  gabapentin (NEURONTIN) 300 MG capsule, Take 300 mg by mouth 3 (Three) Times a Day., Disp: , Rfl:   •  hydroxychloroquine (PLAQUENIL) 200 MG tablet, Take 200 mg by mouth 2 (Two) Times a Day., Disp: , Rfl:   •  levothyroxine (SYNTHROID, LEVOTHROID) 50 MCG tablet, Take 50 mcg by mouth Daily., Disp: , Rfl:   •  pantoprazole (PROTONIX) 40 MG EC tablet, 1 po daily in the am 30 minutes before breakfast, Disp: 30 tablet, Rfl: 0  •  pramipexole (MIRAPEX) 0.5 MG tablet, TAKE 1 TABLET TWICE DAILY, Disp: 180 tablet, Rfl: 1  •  ramipril (ALTACE) 5 MG capsule, Take 5 mg by mouth Daily., Disp: , Rfl:   •  TiZANidine (ZANAFLEX) 4 MG capsule, Take 4 mg by mouth Daily., Disp: , Rfl:   •  vitamin D (ERGOCALCIFEROL) 1.25 MG (45713 UT) capsule capsule, Take 50,000 Units by mouth Every 14 (Fourteen) Days., Disp: , Rfl:      Allergies   Allergen Reactions   • Penicillins Rash   • Sulfa Antibiotics Nausea And Vomiting     The following portions of the patient's history were reviewed and updated as appropriate: allergies, current medications, past family history, past medical  "history, past social history, past surgical history and problem list.  Objective     Physical Exam  Vitals and nursing note reviewed.   Constitutional:       General: She is not in acute distress.     Appearance: Normal appearance. She is well-developed.   HENT:      Head: Normocephalic and atraumatic.      Right Ear: Hearing normal.      Left Ear: Hearing normal.      Mouth/Throat:      Mouth: Mucous membranes are not pale, not dry and not cyanotic.   Eyes:      General: Lids are normal.      Conjunctiva/sclera: Conjunctivae normal.   Neck:      Trachea: Trachea normal.   Cardiovascular:      Rate and Rhythm: Normal rate and regular rhythm.      Heart sounds: Normal heart sounds.   Pulmonary:      Effort: Pulmonary effort is normal. No respiratory distress.      Breath sounds: Normal breath sounds.   Abdominal:      General: Bowel sounds are normal.      Palpations: Abdomen is soft. There is no mass.      Tenderness: There is no abdominal tenderness.      Hernia: No hernia is present.   Skin:     General: Skin is warm and dry.   Neurological:      Mental Status: She is alert and oriented to person, place, and time.   Psychiatric:         Mood and Affect: Mood normal.         Speech: Speech normal.         Behavior: Behavior normal. Behavior is cooperative.       Vitals:    11/24/21 1049   BP: 162/77   Pulse: 84   Resp: 16   Temp: 99.1 °F (37.3 °C)   Weight: 79.8 kg (176 lb)   Height: 163.8 cm (64.49\")     Body mass index is 29.75 kg/m².     Results Review:   I reviewed the patient's new clinical results.    Lab on 09/20/2021   Component Date Value Ref Range Status   • WBC 09/20/2021 6.92  3.40 - 10.80 10*3/mm3 Final   • RBC 09/20/2021 4.06  3.77 - 5.28 10*6/mm3 Final   • Hemoglobin 09/20/2021 12.4  12.0 - 15.9 g/dL Final   • Hematocrit 09/20/2021 37.9  34.0 - 46.6 % Final   • MCV 09/20/2021 93.3  79.0 - 97.0 fL Final   • MCH 09/20/2021 30.5  26.6 - 33.0 pg Final   • MCHC 09/20/2021 32.7  31.5 - 35.7 g/dL Final   • " RDW 09/20/2021 13.9  12.3 - 15.4 % Final   • RDW-SD 09/20/2021 47.8  37.0 - 54.0 fl Final   • MPV 09/20/2021 10.7  6.0 - 12.0 fL Final   • Platelets 09/20/2021 225  140 - 450 10*3/mm3 Final   • Iron 09/20/2021 82  37 - 145 mcg/dL Final   • Iron Saturation 09/20/2021 21  20 - 50 % Final   • Transferrin 09/20/2021 264  200 - 360 mg/dL Final   • TIBC 09/20/2021 393  298 - 536 mcg/dL Final   • Ferritin 09/20/2021 19.80  13.00 - 150.00 ng/mL Final   Admission on 09/01/2021, Discharged on 09/01/2021   Component Date Value Ref Range Status   • Case Report 09/01/2021    Final                    Value:Surgical Pathology Report                         Case: [id number]                                  Authorizing Provider:  Kathryn Stewart MD  Collected:           09/01/2021 12:15 PM          Ordering Location:     [facility]    Received:            09/01/2021 02:04 PM                                 [facility]                                                                    Pathologist:           Jaya Nava MD                                                       Specimens:   1) - Large Intestine, Cecum, cecum and ascending colon biopsy for history of  colitis               2) - Large Intestine, Sigmoid Colon, x7                                                             3) - Large Intestine, Left / Descending Colon, biopsy for history of colitis                        4) - Large Intestine, Rectum, x8                                                                    5) - Large Intestine, Rectum, biopsy for history of colitis                                                                   6) - Large Intestine, Sigmoid Colon, biopsy for history of colitis                                  7) - Small Intestine, Ileum, history of colitis                                                     8) - Large Intestine, Transverse Colon, biopsy for history of colitis                               9) - Large  Intestine, Left / Descending Colon, proximal descending colon polyp x 4           CT Abdomen Pelvis With Contrast     Result Date: 7/30/2021  1. No evidence of metastatic disease. 2. No bowel wall thickening or inflammatory process 3. Stable left nephrolithiasis. This report was finalized on 7/30/2021 6:04 PM by Jaya Rogers MD.     EGD dated 8/2/2021  - Normal oropharynx.  - Z-line regular, 37 cm from the incisors.  - 2 cm hiatal hernia.  - No gross lesions in esophagus.  - No endoscopic esophageal abnormality to explain patient's dysphagia. Biopsied.  - Non-bleeding erosive gastropathy. Biopsied. This could be NSAID related  - Seven non-bleeding angioectasias in the stomach. Treated with argon plasma coagulation (APC). Clip (MR conditional) was placed.  - Normal duodenal bulb, first portion of the duodenum, second portion of the duodenum and third portion of the duodenum. Biopsied.  - She could bleed from AVM or from severe erosions  - Duodenum biopsy unremarkable.  Antrum and body biopsy with minimal chronic gastritis.  Esophagus biopsy unremarkable.     Colonoscopy dated 9/1/2021  - One 10 to 12 mm polyp in the proximal descending colon, removed with a hot snare. Resected and retrieved.  - Three 4 to 6 mm polyps in the proximal descending colon, removed with a cold snare. Resected and retrieved.  - Seven 4 to 8 mm polyps in the sigmoid colon, removed with a hot snare. Resected and retrieved.  - Multiple 4 to 12 mm polyps in the rectum and at the recto-sigmoid colon, removed with a hot snare. Resected and retrieved.  - Multiple small polyps left in rectum, distal sigmoid appears to be hyperplastic  - No endosocpic signs of colitis or ileitis  - The examined portion of the ileum was normal. Biopsied.  - Biopsies performed in the sigmoid colon, in the descending colon, in the transverse colon and in the ascending colon and cecum due to prior history of colitis.  - Small bowel biopsy unremarkable.  Random colon  biopsies unremarkable.  Descending colon polyp proximal x4 biopsy with fragments of hyperplastic polyps.  Sigmoid colon polyp x7 biopsy with fragments of hyperplastic polyps.  Rectal polyp x8 biopsies with fragments of hyperplastic polyps.     Assessment / Plan      1. Iron deficiency anemia due to chronic blood loss  Patient denies GI bleeding.  Labs in September 2021 after procedures with no anemia, normal iron level.  Patient continues to take 1 iron pill per week.  Labs  Avoid NSAIDs.  Patient may not be a candidate for anticoagulation in the future.  Capsule endoscopy in the future if anemia continues despite being on PPI.    - CBC (No Diff); Future  - Iron Profile; Future    9/20/2021  Patient denies GI bleeding. Denies abdominal pain, nausea or change in bowel habits. Labs in July 2021 with no anemia, iron level normal at 44, decreased iron saturation at 10%. She is on oral iron supplements.  EGD with nonbleeding erosive gastropathy and nonbleeding angioectasias in the stomach treated with ACP. Clip placed. She was recommended Pantoprazole 40 mg daily, but she was not aware she was to be taking it and has not been on any PPI therapy.  Colonoscopy with polyps removed, random biopsies unremarkable, no evidence of IBD. CTAP with unremarkable GI tract.    7/14/2021  Patient has intermittent anemia for the past 7 - 8 years now.  Etiology is unclear however possible GI bleed is suspected.  She did have a work-up done for the anemia in 2015.  As per record her colonoscopy done revealed ascending colon inflammation ulceration and pathology consistent with chronic colitis.  She also had a right-sided colonic AVMs which were ablated.  Her prep was poor and few small colon polyps were not removed.  Unclear whether patient has any inflammatory bowel disease as she also has a lupus and rheumatoid arthritis.  However she does not have any current significant lower GI symptoms.    2. Gastroesophageal reflux disease without  esophagitis  3. Dysphagia, unspecified type  Reflux reasonably controlled with pantoprazole 40 mg daily.  Continue same for now.  Patient wishes to try taking on an as-needed basis due to risks associated with long term PPI use.  No difficulty swallowing currently.  Antireflux measures.  Esophagram in the future if difficulty swallowing returns.    9/20/2021  Reflux is doing well. No problems swallowing at this time. She is not on PPI therapy at this time. She was prescribed Pantoprazole 40 mg daily after her EGD, but she was not aware and she has not been taking it. She is not on any medications for reflux at this time. CTAP of GI tract unremarkable. EGD with no endoscopic abnormality to explain patient's dysphagia. Esophagus biopsy unremarkable. Suspect esophageal dysmotility.    4. Personal history of colonic polyps  Colonoscopy with multiple polyps removed, >10 large polyps.  No family history of colon cancer.  Colonoscopy for surveillance in 1 year, September 2022.  We will schedule at follow-up.    Patient Instructions   Antireflux measures: Avoid fried, fatty foods, alcohol, chocolate, coffee, tea,  soft drinks, peppermint and spearmint, spicy foods, tomatoes and tomato based foods, onion based foods, and smoking.   Other antireflux measures include weight reduction if overweight, avoiding tight clothing around the abdomen, elevating the head of the bed 6 inches with blocks under the head board, and don't drink or eat before going to bed and avoid lying down immediately after meals.   Pantoprazole 40 mg 1 po daily in the am 30 minutes before breakfast. The patient wishes to take as needed.   Labs  Colonoscopy in September 2022.   Follow up: July 2022 or sooner if needed    Shannon Chavez, APRN  11/24/2021    Please note that portions of this note may have been completed with a voice recognition program. Efforts were made to edit the dictations, but occasionally words are mistranscribed.

## 2021-11-29 ENCOUNTER — LAB (OUTPATIENT)
Dept: LAB | Facility: HOSPITAL | Age: 76
End: 2021-11-29

## 2021-11-29 DIAGNOSIS — D50.0 IRON DEFICIENCY ANEMIA DUE TO CHRONIC BLOOD LOSS: Primary | ICD-10-CM

## 2021-11-29 PROCEDURE — 83540 ASSAY OF IRON: CPT | Performed by: NURSE PRACTITIONER

## 2021-11-29 PROCEDURE — 85027 COMPLETE CBC AUTOMATED: CPT | Performed by: NURSE PRACTITIONER

## 2021-11-29 PROCEDURE — 84466 ASSAY OF TRANSFERRIN: CPT | Performed by: NURSE PRACTITIONER

## 2021-12-02 ENCOUNTER — OFFICE VISIT (OUTPATIENT)
Dept: NEUROLOGY | Facility: CLINIC | Age: 76
End: 2021-12-02

## 2021-12-02 VITALS
TEMPERATURE: 97.1 F | OXYGEN SATURATION: 96 % | WEIGHT: 173.4 LBS | DIASTOLIC BLOOD PRESSURE: 80 MMHG | HEIGHT: 55 IN | SYSTOLIC BLOOD PRESSURE: 140 MMHG | BODY MASS INDEX: 40.13 KG/M2 | HEART RATE: 87 BPM

## 2021-12-02 DIAGNOSIS — G25.81 RESTLESS LEGS SYNDROME (RLS): ICD-10-CM

## 2021-12-02 DIAGNOSIS — G60.9 IDIOPATHIC PERIPHERAL NEUROPATHY: Primary | ICD-10-CM

## 2021-12-02 DIAGNOSIS — R26.89 IMBALANCE: ICD-10-CM

## 2021-12-02 PROCEDURE — 99213 OFFICE O/P EST LOW 20 MIN: CPT | Performed by: NURSE PRACTITIONER

## 2021-12-02 RX ORDER — FLUTICASONE PROPIONATE 50 MCG
1 SPRAY, SUSPENSION (ML) NASAL AS NEEDED
COMMUNITY
End: 2023-03-16

## 2021-12-02 NOTE — PROGRESS NOTES
Follow Up Office Visit      Patient Name: Padmini Leonard  : 1945   MRN: [id number]     Chief Complaint:    Chief Complaint   Patient presents with   • Follow-up     patient in office to follow up on EMG.       History of Present Illness: Padmini Leonard is a 76 y.o. female who is here today to follow up after having EMG/NCV done and was last seen on 2021.  She is taking Sinemet 25/100mg TID and reports good toleration and compliance.  Her dose of Pramipexole was increased but she is still only taking it once daily (was ordered BID) for symptoms of RLS-says she didn't know she was suppose to increase the frequency. She is having numbness in her feet, no burning, not much pain.  She was prescribed Gabapentin again but has not started that for fear of the potential side effects.  She continues to have poor balance and tremors at times.  She denies any recent falls.    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      Pertinent Medical History:  Padmini Leonard is a 76 y.o. female who is here today to establish care with Neurology.  Patient's referral is for neuropathy but patient says she is here because of her unsteady gait and problems with balance.  She saw a neurologist in [place] and has been on Sinemet for at least 6 years.  She is taking Sinemet 25/100mg TID.  She feels the medication did help with her symptoms when she started it.  She does feel her symptoms have been worse over the past year.  She says she has never been formally diagnosed with Parkinson's disease.  She has a tremor when she paints and writes.  She does have a decreased sense of smell.  She denies significant constipation.  She denies blurry vision.  She has had a couple of near falls recently but has been able to grab onto something to keep from falling.  She is unable to climb a step ladder because she loses her balance.  She  "can't walk well in the grass and states \"I'm like a drunk\".  She denies any previous use of antipsychotic medications.  She was previously diagnosed with peripheral neuropathy.  She took Gabapentin in the past, for neuropathy, and did not tolerate it well.  Additional risk factors- BMI 28, RLS, peripheral neuropathy, mood disorder, dyslipidemia, anemia, HTN, low back pain, cigarette smoker, RA, lupus, history of hyperthyroidism, COPD, fibromyalgia.   *At the end of the visit the patient says, \"Well, who do I see about my restless leg?\".  She is taking Pramipexole 0.5mg QHS and states \"I just started that\"-but then goes on to say she has been taking it for about 4 months.  The medication helps her sleep.  She continues to have frequent leg movements during the day.  She has had symptoms of restless leg syndrome for several years. She says when her iron level is low her leg movements are much worse.  She is taking supplemental iron at this time.   *PCP referral note reviewed prior to visit.      Pertinent Diagnostics-  *TSH on 2/23/2021 was normal.     Subjective      Review of Systems:   Review of Systems   Constitutional: Negative for chills, fatigue and fever.   HENT: Negative for facial swelling, hearing loss, sore throat, tinnitus and trouble swallowing.    Eyes: Negative for blurred vision, double vision, photophobia and visual disturbance.   Respiratory: Negative for cough, chest tightness and shortness of breath.    Cardiovascular: Negative for chest pain, palpitations and leg swelling.   Gastrointestinal: Negative for abdominal pain, nausea and vomiting.   Endocrine: Negative for cold intolerance and heat intolerance.   Musculoskeletal: Positive for gait problem. Negative for neck pain and neck stiffness.   Skin: Negative for color change and rash.   Allergic/Immunologic: Negative for environmental allergies and food allergies.   Neurological: Positive for numbness. Negative for dizziness, syncope, speech " difficulty, weakness, light-headedness, headache and memory problem.   Psychiatric/Behavioral: Negative for behavioral problems, sleep disturbance and depressed mood. The patient is not nervous/anxious.        I have reviewed and the following portions of the patient's history were updated as appropriate: past family history, past medical history, past social history, past surgical history and problem list.    Medications:     Current Outpatient Medications:   •  alendronate (FOSAMAX) 70 MG tablet, Take 70 mg by mouth Every 7 (Seven) Days., Disp: , Rfl:   •  amLODIPine (NORVASC) 2.5 MG tablet, 1 tablet Daily., Disp: , Rfl:   •  atorvastatin (LIPITOR) 80 MG tablet, Take 80 mg by mouth Daily., Disp: , Rfl:   •  busPIRone (BUSPAR) 10 MG tablet, Take 10 mg by mouth 3 (Three) Times a Day., Disp: , Rfl:   •  vitamin D (ERGOCALCIFEROL) 1.25 MG (58592 UT) capsule capsule, Take 50,000 Units by mouth Every 14 (Fourteen) Days., Disp: , Rfl:   •  carbidopa-levodopa (SINEMET)  MG per tablet, Take 1 tablet by mouth 3 (Three) Times a Day., Disp: , Rfl:   •  citalopram (CeleXA) 40 MG tablet, Take 1 tablet by mouth Daily., Disp: , Rfl:   •  clopidogrel (PLAVIX) 75 MG tablet, Take 75 mg by mouth Daily., Disp: , Rfl:   •  ferrous sulfate 325 (65 FE) MG tablet, Take 325 mg by mouth Every 14 (Fourteen) Days., Disp: , Rfl:   •  fluticasone (FLONASE) 50 MCG/ACT nasal spray, fluticasone propionate 50 mcg/actuation nasal spray,suspension, Disp: , Rfl:   •  gabapentin (NEURONTIN) 300 MG capsule, Take 300 mg by mouth 3 (Three) Times a Day., Disp: , Rfl:   •  hydroxychloroquine (PLAQUENIL) 200 MG tablet, Take 200 mg by mouth 2 (Two) Times a Day., Disp: , Rfl:   •  levothyroxine (SYNTHROID, LEVOTHROID) 50 MCG tablet, Take 50 mcg by mouth Daily., Disp: , Rfl:   •  pantoprazole (PROTONIX) 40 MG EC tablet, 1 po daily in the am 30 minutes before breakfast, Disp: 30 tablet, Rfl: 0  •  pramipexole (MIRAPEX) 0.5 MG tablet, TAKE 1 TABLET TWICE  "DAILY, Disp: 180 tablet, Rfl: 1  •  ramipril (ALTACE) 5 MG capsule, Take 5 mg by mouth Daily., Disp: , Rfl:   •  TiZANidine (ZANAFLEX) 4 MG capsule, Take 4 mg by mouth Daily., Disp: , Rfl:     Allergies:   Allergies   Allergen Reactions   • Penicillins Rash   • Sulfa Antibiotics Nausea And Vomiting       Objective     Physical Exam:  Vital Signs:   Vitals:    12/02/21 1043   BP: 140/80   BP Location: Right arm   Patient Position: Sitting   Cuff Size: Adult   Pulse: 87   Temp: 97.1 °F (36.2 °C)   SpO2: 96%   Weight: 78.7 kg (173 lb 6.4 oz)   Height: 64.5 cm (25.39\")   PainSc: 0-No pain     Body mass index is 189.12 kg/m².    Physical Exam  Vitals and nursing note reviewed.   Constitutional:       General: She is not in acute distress.     Appearance: Normal appearance. She is well-developed. She is not diaphoretic.   HENT:      Head: Normocephalic and atraumatic.   Eyes:      Conjunctiva/sclera: Conjunctivae normal.   Pulmonary:      Effort: Pulmonary effort is normal. No respiratory distress.   Skin:     General: Skin is dry.      Findings: No rash.   Neurological:      Mental Status: She is alert and oriented to person, place, and time.      Gait: Gait is intact.   Psychiatric:         Mood and Affect: Mood normal.         Behavior: Behavior normal.         Thought Content: Thought content normal.         Judgment: Judgment normal.         Neurologic Exam     Mental Status   Oriented to person, place, and time.     Gait, Coordination, and Reflexes     Gait  Gait: normal    Tremor   Resting tremor: absent  Intention tremor: absent  Action tremor: absent       Assessment / Plan      Assessment/Plan:   Diagnoses and all orders for this visit:    1. Idiopathic peripheral neuropathy (Primary)    2. Imbalance    3. Restless legs syndrome (RLS)    4. BMI 29.0-29.9,adult    *Offered to lower dose of Gabapentin but patient says she is not going to take that medication.  Offered course of PT for gait training and muscle " strengthening but patient would like to wait on that.  Fall precautions discussed.  Advised patient to use an assistive device to help prevent falls.  Encouraged her to avoid walking on uneven ground.      Follow Up:   Return if symptoms worsen or fail to improve, for Recheck.    GEOVANNY Back, FNP-C  [facility]       Please note that portions of this note may have been completed with a voice recognition program. Efforts were made to edit the dictations, but occasionally words are mistranscribed.

## 2022-01-24 RX ORDER — PANTOPRAZOLE SODIUM 40 MG/1
TABLET, DELAYED RELEASE ORAL
Qty: 90 TABLET | Refills: 1 | Status: SHIPPED | OUTPATIENT
Start: 2022-01-24 | End: 2023-03-16

## 2022-02-07 ENCOUNTER — TRANSCRIBE ORDERS (OUTPATIENT)
Dept: LAB | Facility: HOSPITAL | Age: 77
End: 2022-02-07

## 2022-02-07 DIAGNOSIS — J06.9 ACUTE URI: Primary | ICD-10-CM

## 2022-02-08 ENCOUNTER — LAB (OUTPATIENT)
Dept: LAB | Facility: HOSPITAL | Age: 77
End: 2022-02-08

## 2022-02-08 DIAGNOSIS — J06.9 ACUTE URI: ICD-10-CM

## 2022-02-08 LAB — SARS-COV-2 RNA NOSE QL NAA+PROBE: DETECTED

## 2022-02-08 PROCEDURE — U0004 COV-19 TEST NON-CDC HGH THRU: HCPCS

## 2022-02-17 ENCOUNTER — TRANSCRIBE ORDERS (OUTPATIENT)
Dept: ADMINISTRATIVE | Facility: HOSPITAL | Age: 77
End: 2022-02-17

## 2022-02-17 DIAGNOSIS — M85.80 OTHER SPECIFIED DISORDERS OF BONE DENSITY AND STRUCTURE, UNSPECIFIED SITE: Primary | ICD-10-CM

## 2022-02-17 DIAGNOSIS — Z78.0 ASYMPTOMATIC MENOPAUSAL STATE: ICD-10-CM

## 2022-02-22 ENCOUNTER — APPOINTMENT (OUTPATIENT)
Dept: BONE DENSITY | Facility: HOSPITAL | Age: 77
End: 2022-02-22

## 2022-02-22 DIAGNOSIS — Z78.0 ASYMPTOMATIC MENOPAUSAL STATE: ICD-10-CM

## 2022-02-22 PROCEDURE — 77080 DXA BONE DENSITY AXIAL: CPT

## 2022-04-20 DIAGNOSIS — G25.81 RESTLESS LEGS SYNDROME (RLS): ICD-10-CM

## 2022-04-21 RX ORDER — PRAMIPEXOLE DIHYDROCHLORIDE 0.5 MG/1
TABLET ORAL
Qty: 180 TABLET | Refills: 1 | Status: SHIPPED | OUTPATIENT
Start: 2022-04-21 | End: 2022-12-22 | Stop reason: SDUPTHER

## 2022-06-06 ENCOUNTER — TRANSCRIBE ORDERS (OUTPATIENT)
Dept: ADMINISTRATIVE | Facility: HOSPITAL | Age: 77
End: 2022-06-06

## 2022-06-06 DIAGNOSIS — Z12.31 ENCOUNTER FOR SCREENING MAMMOGRAM FOR MALIGNANT NEOPLASM OF BREAST: ICD-10-CM

## 2022-06-06 DIAGNOSIS — Z12.39 ENCOUNTER FOR OTHER SCREENING FOR MALIGNANT NEOPLASM OF BREAST: Primary | ICD-10-CM

## 2022-06-25 ENCOUNTER — APPOINTMENT (OUTPATIENT)
Dept: CT IMAGING | Facility: HOSPITAL | Age: 77
End: 2022-06-25

## 2022-06-25 ENCOUNTER — HOSPITAL ENCOUNTER (OUTPATIENT)
Facility: HOSPITAL | Age: 77
Setting detail: OBSERVATION
Discharge: HOME | End: 2022-06-27
Attending: EMERGENCY MEDICINE | Admitting: FAMILY MEDICINE

## 2022-06-25 ENCOUNTER — APPOINTMENT (OUTPATIENT)
Dept: GENERAL RADIOLOGY | Facility: HOSPITAL | Age: 77
End: 2022-06-25

## 2022-06-25 DIAGNOSIS — Z74.09 IMPAIRED MOBILITY AND ADLS: ICD-10-CM

## 2022-06-25 DIAGNOSIS — G45.9 TIA (TRANSIENT ISCHEMIC ATTACK): ICD-10-CM

## 2022-06-25 DIAGNOSIS — Z78.9 IMPAIRED MOBILITY AND ADLS: ICD-10-CM

## 2022-06-25 DIAGNOSIS — R29.90 STROKE-LIKE SYMPTOMS: ICD-10-CM

## 2022-06-25 LAB
ABO GROUP BLD: NORMAL
ALBUMIN SERPL-MCNC: 4.4 G/DL (ref 3.5–5.2)
ALBUMIN/GLOB SERPL: 2 G/DL
ALP SERPL-CCNC: 121 U/L (ref 39–117)
ALT SERPL W P-5'-P-CCNC: 7 U/L (ref 1–33)
ANION GAP SERPL CALCULATED.3IONS-SCNC: 12.6 MMOL/L (ref 5–15)
APTT PPP: 28.1 SECONDS (ref 23.5–35.5)
AST SERPL-CCNC: 18 U/L (ref 1–32)
BASOPHILS # BLD AUTO: 0.08 10*3/MM3 (ref 0–0.2)
BASOPHILS NFR BLD AUTO: 0.9 % (ref 0–1.5)
BILIRUB SERPL-MCNC: 0.3 MG/DL (ref 0–1.2)
BLD GP AB SCN SERPL QL: NEGATIVE
BUN SERPL-MCNC: 15 MG/DL (ref 8–23)
BUN/CREAT SERPL: 18.3 (ref 7–25)
CALCIUM SPEC-SCNC: 9.1 MG/DL (ref 8.6–10.5)
CHLORIDE SERPL-SCNC: 102 MMOL/L (ref 98–107)
CO2 SERPL-SCNC: 22.4 MMOL/L (ref 22–29)
CREAT SERPL-MCNC: 0.82 MG/DL (ref 0.57–1)
DEPRECATED RDW RBC AUTO: 48.5 FL (ref 37–54)
EGFRCR SERPLBLD CKD-EPI 2021: 72.9 ML/MIN/1.73
EOSINOPHIL # BLD AUTO: 0.06 10*3/MM3 (ref 0–0.4)
EOSINOPHIL NFR BLD AUTO: 0.7 % (ref 0.3–6.2)
ERYTHROCYTE [DISTWIDTH] IN BLOOD BY AUTOMATED COUNT: 13.5 % (ref 12.3–15.4)
GLOBULIN UR ELPH-MCNC: 2.2 GM/DL
GLUCOSE BLDC GLUCOMTR-MCNC: 147 MG/DL (ref 70–130)
GLUCOSE SERPL-MCNC: 96 MG/DL (ref 65–99)
HCT VFR BLD AUTO: 37.8 % (ref 34–46.6)
HGB BLD-MCNC: 12.6 G/DL (ref 12–15.9)
HOLD SPECIMEN: NORMAL
HOLD SPECIMEN: NORMAL
IMM GRANULOCYTES # BLD AUTO: 0.03 10*3/MM3 (ref 0–0.05)
IMM GRANULOCYTES NFR BLD AUTO: 0.3 % (ref 0–0.5)
LYMPHOCYTES # BLD AUTO: 1.12 10*3/MM3 (ref 0.7–3.1)
LYMPHOCYTES NFR BLD AUTO: 12.4 % (ref 19.6–45.3)
MCH RBC QN AUTO: 32.3 PG (ref 26.6–33)
MCHC RBC AUTO-ENTMCNC: 33.3 G/DL (ref 31.5–35.7)
MCV RBC AUTO: 96.9 FL (ref 79–97)
MONOCYTES # BLD AUTO: 0.58 10*3/MM3 (ref 0.1–0.9)
MONOCYTES NFR BLD AUTO: 6.4 % (ref 5–12)
NEUTROPHILS NFR BLD AUTO: 7.14 10*3/MM3 (ref 1.7–7)
NEUTROPHILS NFR BLD AUTO: 79.3 % (ref 42.7–76)
NRBC BLD AUTO-RTO: 0 /100 WBC (ref 0–0.2)
PLATELET # BLD AUTO: 252 10*3/MM3 (ref 140–450)
PMV BLD AUTO: 9.3 FL (ref 6–12)
POTASSIUM SERPL-SCNC: 4.6 MMOL/L (ref 3.5–5.2)
PROT SERPL-MCNC: 6.6 G/DL (ref 6–8.5)
RBC # BLD AUTO: 3.9 10*6/MM3 (ref 3.77–5.28)
RH BLD: POSITIVE
SODIUM SERPL-SCNC: 137 MMOL/L (ref 136–145)
T&S EXPIRATION DATE: NORMAL
TROPONIN T SERPL-MCNC: <0.01 NG/ML (ref 0–0.03)
WBC NRBC COR # BLD: 9.01 10*3/MM3 (ref 3.4–10.8)
WHOLE BLOOD HOLD COAG: NORMAL
WHOLE BLOOD HOLD SPECIMEN: NORMAL

## 2022-06-25 PROCEDURE — 25010000002 IOPAMIDOL 61 % SOLUTION: Performed by: EMERGENCY MEDICINE

## 2022-06-25 PROCEDURE — 0042T HC CT CEREBRAL PERFUSION W/WO CONTRAST: CPT

## 2022-06-25 PROCEDURE — 99220 PR INITIAL OBSERVATION CARE/DAY 70 MINUTES: CPT | Performed by: FAMILY MEDICINE

## 2022-06-25 PROCEDURE — 82962 GLUCOSE BLOOD TEST: CPT

## 2022-06-25 PROCEDURE — G0378 HOSPITAL OBSERVATION PER HR: HCPCS

## 2022-06-25 PROCEDURE — 99284 EMERGENCY DEPT VISIT MOD MDM: CPT

## 2022-06-25 PROCEDURE — 86901 BLOOD TYPING SEROLOGIC RH(D): CPT | Performed by: EMERGENCY MEDICINE

## 2022-06-25 PROCEDURE — 85730 THROMBOPLASTIN TIME PARTIAL: CPT | Performed by: EMERGENCY MEDICINE

## 2022-06-25 PROCEDURE — 70496 CT ANGIOGRAPHY HEAD: CPT

## 2022-06-25 PROCEDURE — 70498 CT ANGIOGRAPHY NECK: CPT

## 2022-06-25 PROCEDURE — 71045 X-RAY EXAM CHEST 1 VIEW: CPT

## 2022-06-25 PROCEDURE — 80053 COMPREHEN METABOLIC PANEL: CPT | Performed by: EMERGENCY MEDICINE

## 2022-06-25 PROCEDURE — 70450 CT HEAD/BRAIN W/O DYE: CPT

## 2022-06-25 PROCEDURE — 86850 RBC ANTIBODY SCREEN: CPT | Performed by: EMERGENCY MEDICINE

## 2022-06-25 PROCEDURE — 86900 BLOOD TYPING SEROLOGIC ABO: CPT | Performed by: EMERGENCY MEDICINE

## 2022-06-25 PROCEDURE — 85025 COMPLETE CBC W/AUTO DIFF WBC: CPT | Performed by: EMERGENCY MEDICINE

## 2022-06-25 PROCEDURE — 82607 VITAMIN B-12: CPT | Performed by: FAMILY MEDICINE

## 2022-06-25 PROCEDURE — 84484 ASSAY OF TROPONIN QUANT: CPT | Performed by: EMERGENCY MEDICINE

## 2022-06-25 RX ORDER — LEVOTHYROXINE SODIUM 0.05 MG/1
50 TABLET ORAL
Status: DISCONTINUED | OUTPATIENT
Start: 2022-06-26 | End: 2022-06-27 | Stop reason: HOSPADM

## 2022-06-25 RX ORDER — PRAMIPEXOLE DIHYDROCHLORIDE 0.25 MG/1
0.5 TABLET ORAL 2 TIMES DAILY
Status: DISCONTINUED | OUTPATIENT
Start: 2022-06-25 | End: 2022-06-27 | Stop reason: HOSPADM

## 2022-06-25 RX ORDER — ASPIRIN 325 MG
325 TABLET ORAL ONCE
Status: COMPLETED | OUTPATIENT
Start: 2022-06-25 | End: 2022-06-25

## 2022-06-25 RX ORDER — PANTOPRAZOLE SODIUM 40 MG/1
40 TABLET, DELAYED RELEASE ORAL
Status: DISCONTINUED | OUTPATIENT
Start: 2022-06-26 | End: 2022-06-27 | Stop reason: HOSPADM

## 2022-06-25 RX ORDER — SODIUM CHLORIDE 0.9 % (FLUSH) 0.9 %
10 SYRINGE (ML) INJECTION AS NEEDED
Status: DISCONTINUED | OUTPATIENT
Start: 2022-06-25 | End: 2022-06-27 | Stop reason: HOSPADM

## 2022-06-25 RX ORDER — CHOLECALCIFEROL (VITAMIN D3) 125 MCG
5 CAPSULE ORAL NIGHTLY PRN
Status: DISCONTINUED | OUTPATIENT
Start: 2022-06-25 | End: 2022-06-27 | Stop reason: HOSPADM

## 2022-06-25 RX ORDER — ATORVASTATIN CALCIUM 80 MG/1
80 TABLET, FILM COATED ORAL NIGHTLY
Status: DISCONTINUED | OUTPATIENT
Start: 2022-06-25 | End: 2022-06-27 | Stop reason: HOSPADM

## 2022-06-25 RX ORDER — SODIUM CHLORIDE 0.9 % (FLUSH) 0.9 %
10 SYRINGE (ML) INJECTION EVERY 12 HOURS SCHEDULED
Status: DISCONTINUED | OUTPATIENT
Start: 2022-06-25 | End: 2022-06-27 | Stop reason: HOSPADM

## 2022-06-25 RX ORDER — HYDROXYCHLOROQUINE SULFATE 200 MG/1
200 TABLET, FILM COATED ORAL EVERY 12 HOURS SCHEDULED
Status: DISCONTINUED | OUTPATIENT
Start: 2022-06-25 | End: 2022-06-27 | Stop reason: HOSPADM

## 2022-06-25 RX ORDER — ACETAMINOPHEN 650 MG/1
650 SUPPOSITORY RECTAL EVERY 4 HOURS PRN
Status: DISCONTINUED | OUTPATIENT
Start: 2022-06-25 | End: 2022-06-27 | Stop reason: HOSPADM

## 2022-06-25 RX ORDER — NICOTINE 21 MG/24HR
1 PATCH, TRANSDERMAL 24 HOURS TRANSDERMAL EVERY 24 HOURS
Status: DISCONTINUED | OUTPATIENT
Start: 2022-06-25 | End: 2022-06-27 | Stop reason: HOSPADM

## 2022-06-25 RX ORDER — SODIUM CHLORIDE 0.9 % (FLUSH) 0.9 %
10 SYRINGE (ML) INJECTION AS NEEDED
Status: DISCONTINUED | OUTPATIENT
Start: 2022-06-25 | End: 2022-06-27 | Stop reason: SDUPTHER

## 2022-06-25 RX ORDER — ACETAMINOPHEN 325 MG/1
650 TABLET ORAL EVERY 4 HOURS PRN
Status: DISCONTINUED | OUTPATIENT
Start: 2022-06-25 | End: 2022-06-27 | Stop reason: HOSPADM

## 2022-06-25 RX ORDER — CITALOPRAM 20 MG/1
20 TABLET ORAL DAILY
Status: DISCONTINUED | OUTPATIENT
Start: 2022-06-26 | End: 2022-06-27 | Stop reason: HOSPADM

## 2022-06-25 RX ORDER — CLOPIDOGREL BISULFATE 75 MG/1
75 TABLET ORAL DAILY
Status: DISCONTINUED | OUTPATIENT
Start: 2022-06-26 | End: 2022-06-27 | Stop reason: HOSPADM

## 2022-06-25 RX ORDER — TIZANIDINE 4 MG/1
4 TABLET ORAL EVERY 12 HOURS PRN
Status: DISCONTINUED | OUTPATIENT
Start: 2022-06-25 | End: 2022-06-27 | Stop reason: HOSPADM

## 2022-06-25 RX ORDER — AMLODIPINE BESYLATE 5 MG/1
2.5 TABLET ORAL
Status: DISCONTINUED | OUTPATIENT
Start: 2022-06-26 | End: 2022-06-27 | Stop reason: HOSPADM

## 2022-06-25 RX ADMIN — ASPIRIN 325 MG ORAL TABLET 325 MG: 325 PILL ORAL at 17:22

## 2022-06-25 RX ADMIN — Medication 1 PATCH: at 21:10

## 2022-06-25 RX ADMIN — IOPAMIDOL 100 ML: 612 INJECTION, SOLUTION INTRAVENOUS at 16:11

## 2022-06-25 RX ADMIN — PRAMIPEXOLE DIHYDROCHLORIDE 0.5 MG: 0.25 TABLET ORAL at 21:09

## 2022-06-25 RX ADMIN — Medication 5 MG: at 21:09

## 2022-06-25 RX ADMIN — CARBIDOPA AND LEVODOPA 1 TABLET: 25; 100 TABLET ORAL at 21:09

## 2022-06-25 RX ADMIN — TIZANIDINE 4 MG: 4 TABLET ORAL at 21:09

## 2022-06-25 RX ADMIN — IOPAMIDOL 50 ML: 612 INJECTION, SOLUTION INTRAVENOUS at 16:11

## 2022-06-25 RX ADMIN — ATORVASTATIN CALCIUM 80 MG: 80 TABLET, FILM COATED ORAL at 21:09

## 2022-06-25 RX ADMIN — HYDROXYCHLOROQUINE SULFATE 200 MG: 200 TABLET ORAL at 21:09

## 2022-06-25 RX ADMIN — Medication 10 ML: at 21:09

## 2022-06-26 ENCOUNTER — APPOINTMENT (OUTPATIENT)
Dept: CARDIOLOGY | Facility: HOSPITAL | Age: 77
End: 2022-06-26

## 2022-06-26 LAB
25(OH)D3 SERPL-MCNC: 33.4 NG/ML (ref 30–100)
ALBUMIN SERPL-MCNC: 3.5 G/DL (ref 3.5–5.2)
ALBUMIN/GLOB SERPL: 1.7 G/DL
ALP SERPL-CCNC: 99 U/L (ref 39–117)
ALT SERPL W P-5'-P-CCNC: 7 U/L (ref 1–33)
ANION GAP SERPL CALCULATED.3IONS-SCNC: 10.6 MMOL/L (ref 5–15)
AST SERPL-CCNC: 15 U/L (ref 1–32)
BILIRUB SERPL-MCNC: 0.2 MG/DL (ref 0–1.2)
BUN SERPL-MCNC: 18 MG/DL (ref 8–23)
BUN/CREAT SERPL: 20.7 (ref 7–25)
CALCIUM SPEC-SCNC: 8.9 MG/DL (ref 8.6–10.5)
CHLORIDE SERPL-SCNC: 106 MMOL/L (ref 98–107)
CHOLEST SERPL-MCNC: 125 MG/DL (ref 0–200)
CO2 SERPL-SCNC: 23.4 MMOL/L (ref 22–29)
CREAT SERPL-MCNC: 0.87 MG/DL (ref 0.57–1)
EGFRCR SERPLBLD CKD-EPI 2021: 67.9 ML/MIN/1.73
FOLATE SERPL-MCNC: 7.7 NG/ML (ref 4.78–24.2)
GLOBULIN UR ELPH-MCNC: 2.1 GM/DL
GLUCOSE BLDC GLUCOMTR-MCNC: 110 MG/DL (ref 70–130)
GLUCOSE BLDC GLUCOMTR-MCNC: 99 MG/DL (ref 70–130)
GLUCOSE SERPL-MCNC: 103 MG/DL (ref 65–99)
HBA1C MFR BLD: 5.6 % (ref 4.8–5.6)
HDLC SERPL-MCNC: 79 MG/DL (ref 40–60)
LDLC SERPL CALC-MCNC: 34 MG/DL (ref 0–100)
LDLC/HDLC SERPL: 0.45 {RATIO}
POTASSIUM SERPL-SCNC: 4.5 MMOL/L (ref 3.5–5.2)
PROT SERPL-MCNC: 5.6 G/DL (ref 6–8.5)
SODIUM SERPL-SCNC: 140 MMOL/L (ref 136–145)
TRIGL SERPL-MCNC: 54 MG/DL (ref 0–150)
TSH SERPL DL<=0.05 MIU/L-ACNC: 1.62 UIU/ML (ref 0.27–4.2)
VIT B12 BLD-MCNC: 427 PG/ML (ref 211–946)
VLDLC SERPL-MCNC: 12 MG/DL (ref 5–40)

## 2022-06-26 PROCEDURE — 93306 TTE W/DOPPLER COMPLETE: CPT

## 2022-06-26 PROCEDURE — G0378 HOSPITAL OBSERVATION PER HR: HCPCS

## 2022-06-26 PROCEDURE — 82306 VITAMIN D 25 HYDROXY: CPT | Performed by: FAMILY MEDICINE

## 2022-06-26 PROCEDURE — 99225 PR SBSQ OBSERVATION CARE/DAY 25 MINUTES: CPT | Performed by: FAMILY MEDICINE

## 2022-06-26 PROCEDURE — 80061 LIPID PANEL: CPT | Performed by: FAMILY MEDICINE

## 2022-06-26 PROCEDURE — 83036 HEMOGLOBIN GLYCOSYLATED A1C: CPT | Performed by: FAMILY MEDICINE

## 2022-06-26 PROCEDURE — 97161 PT EVAL LOW COMPLEX 20 MIN: CPT

## 2022-06-26 PROCEDURE — 93306 TTE W/DOPPLER COMPLETE: CPT | Performed by: INTERNAL MEDICINE

## 2022-06-26 PROCEDURE — 82746 ASSAY OF FOLIC ACID SERUM: CPT | Performed by: FAMILY MEDICINE

## 2022-06-26 PROCEDURE — 84443 ASSAY THYROID STIM HORMONE: CPT | Performed by: FAMILY MEDICINE

## 2022-06-26 PROCEDURE — 80053 COMPREHEN METABOLIC PANEL: CPT | Performed by: FAMILY MEDICINE

## 2022-06-26 PROCEDURE — 82962 GLUCOSE BLOOD TEST: CPT

## 2022-06-26 RX ORDER — ASPIRIN 81 MG/1
81 TABLET, CHEWABLE ORAL DAILY
Status: DISCONTINUED | OUTPATIENT
Start: 2022-06-26 | End: 2022-06-26

## 2022-06-26 RX ORDER — CARBOXYMETHYLCELLULOSE SODIUM 5 MG/ML
2 SOLUTION/ DROPS OPHTHALMIC 3 TIMES DAILY PRN
Status: DISCONTINUED | OUTPATIENT
Start: 2022-06-26 | End: 2022-06-27 | Stop reason: HOSPADM

## 2022-06-26 RX ADMIN — HYDROXYCHLOROQUINE SULFATE 200 MG: 200 TABLET ORAL at 08:05

## 2022-06-26 RX ADMIN — ATORVASTATIN CALCIUM 80 MG: 80 TABLET, FILM COATED ORAL at 21:49

## 2022-06-26 RX ADMIN — Medication 10 ML: at 21:48

## 2022-06-26 RX ADMIN — Medication 1 PATCH: at 22:00

## 2022-06-26 RX ADMIN — PRAMIPEXOLE DIHYDROCHLORIDE 0.5 MG: 0.25 TABLET ORAL at 21:48

## 2022-06-26 RX ADMIN — HYDROXYCHLOROQUINE SULFATE 200 MG: 200 TABLET ORAL at 21:49

## 2022-06-26 RX ADMIN — Medication 10 ML: at 08:08

## 2022-06-26 RX ADMIN — AMLODIPINE BESYLATE 2.5 MG: 5 TABLET ORAL at 08:05

## 2022-06-26 RX ADMIN — Medication 5 MG: at 21:49

## 2022-06-26 RX ADMIN — PANTOPRAZOLE SODIUM 40 MG: 40 TABLET, DELAYED RELEASE ORAL at 06:53

## 2022-06-26 RX ADMIN — LEVOTHYROXINE SODIUM 50 MCG: 50 TABLET ORAL at 06:53

## 2022-06-26 RX ADMIN — PRAMIPEXOLE DIHYDROCHLORIDE 0.5 MG: 0.25 TABLET ORAL at 08:05

## 2022-06-26 RX ADMIN — CLOPIDOGREL BISULFATE 75 MG: 75 TABLET ORAL at 08:05

## 2022-06-26 RX ADMIN — CARBIDOPA AND LEVODOPA 1 TABLET: 25; 100 TABLET ORAL at 21:49

## 2022-06-26 RX ADMIN — CARBIDOPA AND LEVODOPA 1 TABLET: 25; 100 TABLET ORAL at 15:41

## 2022-06-26 RX ADMIN — TIZANIDINE 4 MG: 4 TABLET ORAL at 21:49

## 2022-06-26 RX ADMIN — CARBIDOPA AND LEVODOPA 1 TABLET: 25; 100 TABLET ORAL at 08:05

## 2022-06-26 RX ADMIN — CITALOPRAM HYDROBROMIDE 20 MG: 20 TABLET ORAL at 08:05

## 2022-06-27 ENCOUNTER — APPOINTMENT (OUTPATIENT)
Dept: MRI IMAGING | Facility: HOSPITAL | Age: 77
End: 2022-06-27

## 2022-06-27 VITALS
BODY MASS INDEX: 30.05 KG/M2 | TEMPERATURE: 98.1 F | DIASTOLIC BLOOD PRESSURE: 77 MMHG | OXYGEN SATURATION: 97 % | WEIGHT: 176 LBS | HEIGHT: 64 IN | SYSTOLIC BLOOD PRESSURE: 149 MMHG | RESPIRATION RATE: 18 BRPM | HEART RATE: 76 BPM

## 2022-06-27 LAB
BH CV ECHO MEAS - AO MAX PG: 8.8 MMHG
BH CV ECHO MEAS - AO MEAN PG: 5 MMHG
BH CV ECHO MEAS - AO ROOT DIAM: 2.9 CM
BH CV ECHO MEAS - AO V2 MAX: 148 CM/SEC
BH CV ECHO MEAS - AO V2 VTI: 33.5 CM
BH CV ECHO MEAS - AVA(I,D): 1.3 CM2
BH CV ECHO MEAS - EDV(CUBED): 118.4 ML
BH CV ECHO MEAS - EDV(MOD-SP2): 77.9 ML
BH CV ECHO MEAS - EDV(MOD-SP4): 54.5 ML
BH CV ECHO MEAS - EF(MOD-BP): 62.6 %
BH CV ECHO MEAS - EF(MOD-SP2): 68.9 %
BH CV ECHO MEAS - EF(MOD-SP4): 59.3 %
BH CV ECHO MEAS - ESV(CUBED): 40.4 ML
BH CV ECHO MEAS - ESV(MOD-SP2): 24.2 ML
BH CV ECHO MEAS - ESV(MOD-SP4): 22.2 ML
BH CV ECHO MEAS - FS: 30.1 %
BH CV ECHO MEAS - IVS/LVPW: 1.56 CM
BH CV ECHO MEAS - IVSD: 1.09 CM
BH CV ECHO MEAS - LA DIMENSION: 3.1 CM
BH CV ECHO MEAS - LAT PEAK E' VEL: 8.4 CM/SEC
BH CV ECHO MEAS - LV MASS(C)D: 152.4 GRAMS
BH CV ECHO MEAS - LV MAX PG: 2.41 MMHG
BH CV ECHO MEAS - LV MEAN PG: 1 MMHG
BH CV ECHO MEAS - LV V1 MAX: 77.6 CM/SEC
BH CV ECHO MEAS - LV V1 VTI: 15.9 CM
BH CV ECHO MEAS - LVIDD: 4.9 CM
BH CV ECHO MEAS - LVIDS: 3.4 CM
BH CV ECHO MEAS - LVOT AREA: 2.7 CM2
BH CV ECHO MEAS - LVOT DIAM: 1.87 CM
BH CV ECHO MEAS - LVPWD: 0.7 CM
BH CV ECHO MEAS - MED PEAK E' VEL: 6.6 CM/SEC
BH CV ECHO MEAS - MV A MAX VEL: 91.7 CM/SEC
BH CV ECHO MEAS - MV DEC SLOPE: 366.5 CM/SEC2
BH CV ECHO MEAS - MV DEC TIME: 0.23 MSEC
BH CV ECHO MEAS - MV E MAX VEL: 83.4 CM/SEC
BH CV ECHO MEAS - MV E/A: 0.91
BH CV ECHO MEAS - MV MAX PG: 4.8 MMHG
BH CV ECHO MEAS - MV MEAN PG: 2 MMHG
BH CV ECHO MEAS - MV P1/2T: 68.3 MSEC
BH CV ECHO MEAS - MV V2 VTI: 24.7 CM
BH CV ECHO MEAS - MVA(P1/2T): 3.2 CM2
BH CV ECHO MEAS - MVA(VTI): 1.77 CM2
BH CV ECHO MEAS - PA ACC TIME: 0.08 SEC
BH CV ECHO MEAS - PA PR(ACCEL): 42.6 MMHG
BH CV ECHO MEAS - PA V2 MAX: 84.6 CM/SEC
BH CV ECHO MEAS - RV MAX PG: 1.97 MMHG
BH CV ECHO MEAS - RV V1 MAX: 70.2 CM/SEC
BH CV ECHO MEAS - RV V1 VTI: 18.4 CM
BH CV ECHO MEAS - SV(LVOT): 43.7 ML
BH CV ECHO MEAS - SV(MOD-SP2): 53.7 ML
BH CV ECHO MEAS - SV(MOD-SP4): 32.3 ML
BH CV ECHO MEAS - TAPSE (>1.6): 1.68 CM
BH CV ECHO MEASUREMENTS AVERAGE E/E' RATIO: 11.12
BH CV XLRA - TDI S': 11.3 CM/SEC
MAXIMAL PREDICTED HEART RATE: 141 BPM
SARS-COV-2 RNA PNL SPEC NAA+PROBE: NOT DETECTED
STRESS TARGET HR: 120 BPM

## 2022-06-27 PROCEDURE — 99217 PR OBSERVATION CARE DISCHARGE MANAGEMENT: CPT | Performed by: FAMILY MEDICINE

## 2022-06-27 PROCEDURE — 99214 OFFICE O/P EST MOD 30 MIN: CPT | Performed by: STUDENT IN AN ORGANIZED HEALTH CARE EDUCATION/TRAINING PROGRAM

## 2022-06-27 PROCEDURE — 70551 MRI BRAIN STEM W/O DYE: CPT

## 2022-06-27 PROCEDURE — G0378 HOSPITAL OBSERVATION PER HR: HCPCS

## 2022-06-27 PROCEDURE — 97165 OT EVAL LOW COMPLEX 30 MIN: CPT

## 2022-06-27 PROCEDURE — 92610 EVALUATE SWALLOWING FUNCTION: CPT

## 2022-06-27 PROCEDURE — 87635 SARS-COV-2 COVID-19 AMP PRB: CPT | Performed by: FAMILY MEDICINE

## 2022-06-27 RX ORDER — CLOPIDOGREL BISULFATE 75 MG/1
75 TABLET ORAL DAILY
Qty: 30 TABLET | Refills: 0 | Status: SHIPPED | OUTPATIENT
Start: 2022-06-27 | End: 2022-07-27

## 2022-06-27 RX ADMIN — AMLODIPINE BESYLATE 2.5 MG: 5 TABLET ORAL at 09:03

## 2022-06-27 RX ADMIN — CITALOPRAM HYDROBROMIDE 20 MG: 20 TABLET ORAL at 09:03

## 2022-06-27 RX ADMIN — LEVOTHYROXINE SODIUM 50 MCG: 50 TABLET ORAL at 06:43

## 2022-06-27 RX ADMIN — PRAMIPEXOLE DIHYDROCHLORIDE 0.5 MG: 0.25 TABLET ORAL at 09:03

## 2022-06-27 RX ADMIN — Medication 10 ML: at 09:04

## 2022-06-27 RX ADMIN — CLOPIDOGREL BISULFATE 75 MG: 75 TABLET ORAL at 09:03

## 2022-06-27 RX ADMIN — HYDROXYCHLOROQUINE SULFATE 200 MG: 200 TABLET ORAL at 09:03

## 2022-06-27 RX ADMIN — CARBIDOPA AND LEVODOPA 1 TABLET: 25; 100 TABLET ORAL at 09:03

## 2022-06-27 RX ADMIN — PANTOPRAZOLE SODIUM 40 MG: 40 TABLET, DELAYED RELEASE ORAL at 06:43

## 2022-06-28 ENCOUNTER — READMISSION MANAGEMENT (OUTPATIENT)
Dept: CALL CENTER | Facility: HOSPITAL | Age: 77
End: 2022-06-28

## 2022-06-28 NOTE — OUTREACH NOTE
Prep Survey    Flowsheet Row Responses   [facility] facility patient discharged from? [facility]   Is LACE score < 7 ? Yes   Emergency Room discharge w/ pulse ox? No   Eligibility Readm Mgmt   Discharge diagnosis possible TIA   Does the patient have one of the following disease processes/diagnoses(primary or secondary)? Stroke (TIA)   Does the patient have Home health ordered? No   Is there a DME ordered? No   Prep survey completed? Yes          AZAR ZAHO - Registered Nurse

## 2022-06-29 ENCOUNTER — READMISSION MANAGEMENT (OUTPATIENT)
Dept: CALL CENTER | Facility: HOSPITAL | Age: 77
End: 2022-06-29

## 2022-06-29 NOTE — OUTREACH NOTE
Stroke Week 1 Survey    Flowsheet Row Responses   [facility] facility patient discharged from? [facility]   Does the patient have one of the following disease processes/diagnoses(primary or secondary)? Stroke (TIA)   Week 1 attempt successful? No   Unsuccessful attempts Attempt 1          EDIL RIVAS - Registered Nurse

## 2022-07-06 ENCOUNTER — READMISSION MANAGEMENT (OUTPATIENT)
Dept: CALL CENTER | Facility: HOSPITAL | Age: 77
End: 2022-07-06

## 2022-07-06 NOTE — OUTREACH NOTE
Stroke Week 2 Survey    Flowsheet Row Responses   [facility] patient discharged from? [facility]   Does the patient have one of the following disease processes/diagnoses(primary or secondary)? Stroke (TIA)   Week 2 attempt successful? No   Unsuccessful attempts Attempt 1          KARELY JEFFERY - Registered Nurse

## 2022-07-11 ENCOUNTER — READMISSION MANAGEMENT (OUTPATIENT)
Dept: CALL CENTER | Facility: HOSPITAL | Age: 77
End: 2022-07-11

## 2022-07-11 NOTE — OUTREACH NOTE
Stroke Week 2 Survey    Flowsheet Row Responses   [facility] patient discharged from? [facility]   Does the patient have one of the following disease processes/diagnoses(primary or secondary)? Stroke (TIA)   Week 2 attempt successful? Yes   Call start time 1817   Call end time 1823   Discharge diagnosis possible TIA   Meds reviewed with patient/caregiver? Yes   Is the patient having any side effects they believe may be caused by any medication additions or changes? No   Does the patient have all medications ordered at discharge? Yes   Is the patient taking all medications as directed (includes completed medication regime)? Yes   Does the patient have a primary care provider?  Yes   Does the patient have an appointment with their PCP within 7 days of discharge? Yes   Has the patient kept scheduled appointments due by today? Yes   Does the patient require any assistance with activities of daily living such as eating, bathing, dressing, walking, etc.? No   Does the patient have any residual symptoms from stroke/TIA? No   Does the patient understand the diet ordered at discharge? No   Did the patient receive a copy of their discharge instructions? Yes   Nursing interventions Reviewed instructions with patient   What is the patient's perception of their health status since discharge? Improving   Nursing interventions Nurse provided patient education   Is the patient able to teach back FAST for Stroke? Yes   Is the patient/caregiver able to teach back the risk factors for a stroke? High blood pressure-goal below 120/80, Smoking, Diabetes, High Cholesterol, History of TIAs, Physical inactivity and obesity, Sleep apnea, Excessive alcohol intake, HIgh red blood cell count, Carotid or other artery disease   Is the patient/caregiver able to teach back signs and symptoms related to disease process for when to call PCP? Yes   Is the patient/caregiver able to teach back signs and symptoms related to disease process for when  to call 911? Yes   If the patient is a current smoker, are they able to teach back resources for cessation? Not a smoker   Is the patient/caregiver able to teach back the hierarchy of who to call/visit for symptoms/problems? PCP, Specialist, Home health nurse, Urgent Care, ED, 911 Yes   Additional teach back comments States she is doing well and has all follow up appt.    Week 2 call completed? Yes   Revoked No further contact(revokes)-requires comment   Graduated/Revoked comments Denies questions or needs at this time          MOSES ZHAO - Licensed Nurse

## 2022-08-11 ENCOUNTER — APPOINTMENT (OUTPATIENT)
Dept: MAMMOGRAPHY | Facility: HOSPITAL | Age: 77
End: 2022-08-11

## 2022-08-11 ENCOUNTER — OFFICE VISIT (OUTPATIENT)
Dept: NEUROLOGY | Facility: CLINIC | Age: 77
End: 2022-08-11

## 2022-08-11 VITALS
HEART RATE: 86 BPM | DIASTOLIC BLOOD PRESSURE: 68 MMHG | SYSTOLIC BLOOD PRESSURE: 120 MMHG | OXYGEN SATURATION: 95 % | HEIGHT: 64 IN | BODY MASS INDEX: 29.88 KG/M2 | TEMPERATURE: 97.9 F | WEIGHT: 175 LBS

## 2022-08-11 DIAGNOSIS — G60.9 IDIOPATHIC PERIPHERAL NEUROPATHY: ICD-10-CM

## 2022-08-11 DIAGNOSIS — R42 DIZZINESS: ICD-10-CM

## 2022-08-11 DIAGNOSIS — R25.9 ABNORMAL INVOLUNTARY MOVEMENT: ICD-10-CM

## 2022-08-11 DIAGNOSIS — G25.0 BENIGN HEAD TREMOR: ICD-10-CM

## 2022-08-11 DIAGNOSIS — R47.9 DIFFICULTY WITH SPEECH: ICD-10-CM

## 2022-08-11 DIAGNOSIS — R26.89 IMBALANCE: ICD-10-CM

## 2022-08-11 DIAGNOSIS — R25.1 TREMOR OF BOTH HANDS: ICD-10-CM

## 2022-08-11 DIAGNOSIS — R25.9 PARKINSONIAN FEATURES: Primary | ICD-10-CM

## 2022-08-11 PROCEDURE — 99214 OFFICE O/P EST MOD 30 MIN: CPT | Performed by: NURSE PRACTITIONER

## 2022-08-11 RX ORDER — PRIMIDONE 50 MG/1
TABLET ORAL
Qty: 49 TABLET | Refills: 0 | Status: SHIPPED | OUTPATIENT
Start: 2022-08-11 | End: 2022-09-22 | Stop reason: SINTOL

## 2022-08-11 RX ORDER — EPINEPHRINE 0.3 MG/.3ML
INJECTION SUBCUTANEOUS
COMMUNITY

## 2022-08-11 RX ORDER — TIOTROPIUM BROMIDE INHALATION SPRAY 1.56 UG/1
SPRAY, METERED RESPIRATORY (INHALATION)
COMMUNITY
Start: 2022-07-01

## 2022-08-11 NOTE — PROGRESS NOTES
"     Follow Up Office Visit      Patient Name: Padmini Leonard  : 1945   MRN: [id number]     Chief Complaint:    Chief Complaint   Patient presents with   • Follow-up     Patient in office today for balance issues,tremors,dizziness and speech issues       History of Present Illness: Padmini Leonard is a 77 y.o. female who is here today to follow up and was last seen on 2021.  She is taking Sinemet 25/100mg TID and Pramipexole 0.5mg BID- reports good compliance and toleration.  She complains of \"brain fog\", abnormal body movements, imbalance, tremors, dizziness, and stuttering.  She has a cane and walker she uses as needed.  She states, \"Sleeping, not good at all\".  She wakes up to urinate and then can't go back to sleep due to \"the body moving all over\".  Her daughter says \"she always tends to want to fall forward\".  Her daughter says she had a recent home sleep study done and was told she needed oxygen at night.  She thinks her PCP is going to order that but she hasn't heard from anyone about it.    *Recent ED () visit note reviewed at time of visit.  It appears she presented to the ED with suspected TIA but her work up was negative for acute cardiac or neurological event.  It was suspected her symptoms are the result of a progress of Parkinson's disease.      Following taken from previous visit note:   Padmini Leonard is a 76 y.o. female who is here today to follow up after having EMG/NCV done and was last seen on 2021.  She is taking Sinemet 25/100mg TID and reports good toleration and compliance.  Her dose of Pramipexole was increased but she is still only taking it once daily (was ordered BID) for symptoms of RLS-says she didn't know she was suppose to increase the frequency. She is having numbness in her feet, no burning, not much pain.  She was prescribed Gabapentin again but has not started that for fear of the potential side effects.  She continues to have poor balance and tremors at " times.  She denies any recent falls.    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      Subjective      Review of Systems:   Review of Systems   Constitutional: Negative for chills, fatigue and fever.   HENT: Negative for facial swelling, hearing loss, sore throat, tinnitus and trouble swallowing.    Eyes: Negative for blurred vision, double vision, photophobia and visual disturbance.   Respiratory: Negative for cough, chest tightness and shortness of breath.    Cardiovascular: Negative for chest pain, palpitations and leg swelling.   Gastrointestinal: Negative for abdominal pain, nausea and vomiting.   Endocrine: Negative for cold intolerance and heat intolerance.   Musculoskeletal: Positive for gait problem. Negative for neck pain and neck stiffness.   Skin: Negative for color change and rash.   Allergic/Immunologic: Negative for environmental allergies and food allergies.   Neurological: Positive for dizziness and tremors. Negative for syncope, speech difficulty, weakness, light-headedness, numbness, headache and memory problem.   Psychiatric/Behavioral: Negative for behavioral problems, sleep disturbance and depressed mood. The patient is not nervous/anxious.        I have reviewed and the following portions of the patient's history were updated as appropriate: past family history, past medical history, past social history, past surgical history and problem list.    Medications:     Current Outpatient Medications:   •  alendronate (FOSAMAX) 70 MG tablet, Take 70 mg by mouth Every 7 (Seven) Days., Disp: , Rfl:   •  amLODIPine (NORVASC) 2.5 MG tablet, Take 1 tablet by mouth Daily., Disp: , Rfl:   •  atorvastatin (LIPITOR) 80 MG tablet, Take 80 mg by mouth Daily., Disp: , Rfl:   •  busPIRone (BUSPAR) 10 MG tablet, Take 10 mg by mouth 3 (Three) Times a Day., Disp: , Rfl:   •  carbidopa-levodopa (SINEMET)  MG per  tablet, Take 1 tablet by mouth 3 (Three) Times a Day., Disp: , Rfl:   •  citalopram (CeleXA) 40 MG tablet, Take 1 tablet by mouth Daily., Disp: , Rfl:   •  EPINEPHrine (EPIPEN) 0.3 MG/0.3ML solution auto-injector injection, , Disp: , Rfl:   •  ferrous sulfate 325 (65 FE) MG tablet, Take 325 mg by mouth Every 14 (Fourteen) Days., Disp: , Rfl:   •  fluticasone (FLONASE) 50 MCG/ACT nasal spray, 1 spray into the nostril(s) as directed by provider As Needed., Disp: , Rfl:   •  gabapentin (NEURONTIN) 300 MG capsule, Take 300 mg by mouth 3 (Three) Times a Day., Disp: , Rfl:   •  hydroxychloroquine (PLAQUENIL) 200 MG tablet, Take 200 mg by mouth 2 (Two) Times a Day., Disp: , Rfl:   •  levothyroxine (SYNTHROID, LEVOTHROID) 50 MCG tablet, Take 50 mcg by mouth Daily., Disp: , Rfl:   •  loratadine-pseudoephedrine (CLARITIN-D 24-hour)  MG per 24 hr tablet, Take 1 tablet every day by oral route., Disp: , Rfl:   •  pantoprazole (PROTONIX) 40 MG EC tablet, TAKE 1 TABLET BY MOUTH DAILY IN THE MORNING 30 MINUTES BEFORE BREAKFAST., Disp: 90 tablet, Rfl: 1  •  pramipexole (MIRAPEX) 0.5 MG tablet, TAKE 1 TABLET TWICE DAILY, Disp: 180 tablet, Rfl: 1  •  ramipril (ALTACE) 5 MG capsule, Take 5 mg by mouth Daily., Disp: , Rfl:   •  Tiotropium Bromide Monohydrate (Spiriva Respimat) 1.25 MCG/ACT aerosol solution inhaler, Inhale 2 puffs every day by inhalation route., Disp: , Rfl:   •  TiZANidine (ZANAFLEX) 4 MG capsule, Take 4 mg by mouth Daily., Disp: , Rfl:   •  vitamin D (ERGOCALCIFEROL) 1.25 MG (24498 UT) capsule capsule, Take 50,000 Units by mouth Every 14 (Fourteen) Days., Disp: , Rfl:   •  primidone (MYSOLINE) 50 MG tablet, Take 1 tablet by mouth Every Night for 7 days, THEN 2 tablets Every Night for 21 days., Disp: 49 tablet, Rfl: 0    Allergies:   Allergies   Allergen Reactions   • Penicillins Rash   • Sulfa Antibiotics Nausea And Vomiting       Objective     Physical Exam:  Vital Signs:   Vitals:    08/11/22 1037   BP: 120/68  "  Pulse: 86   Temp: 97.9 °F (36.6 °C)   TempSrc: Infrared   SpO2: 95%   Weight: 79.4 kg (175 lb)   Height: 162.6 cm (64\")   PainSc: 0-No pain     Body mass index is 30.04 kg/m².    Physical Exam  Vitals and nursing note reviewed.   Constitutional:       General: She is not in acute distress.     Appearance: Normal appearance. She is well-developed. She is not diaphoretic.   HENT:      Head: Normocephalic and atraumatic.   Eyes:      Extraocular Movements: Extraocular movements intact.      Conjunctiva/sclera: Conjunctivae normal.      Pupils: Pupils are equal, round, and reactive to light.   Pulmonary:      Effort: Pulmonary effort is normal. No respiratory distress.   Musculoskeletal:         General: Normal range of motion.   Skin:     General: Skin is warm and dry.      Findings: No rash.   Neurological:      Mental Status: She is alert and oriented to person, place, and time.      Coordination: Finger-Nose-Finger Test normal.   Psychiatric:         Mood and Affect: Mood normal.         Speech: Speech normal.         Behavior: Behavior normal.         Thought Content: Thought content normal.         Judgment: Judgment normal.      Comments: Decreased facial affect         Neurologic Exam     Mental Status   Oriented to person, place, and time.   Attention: normal. Concentration: normal.   Speech: speech is normal   Level of consciousness: alert  Knowledge: good.     Cranial Nerves   Cranial nerves II through XII intact.     CN II   Visual fields full to confrontation.     CN III, IV, VI   Pupils are equal, round, and reactive to light.  Right pupil: Size: 3 mm. Shape: regular. Reactivity: brisk.   Left pupil: Size: 3 mm. Shape: regular. Reactivity: brisk.   CN III: no CN III palsy  CN VI: no CN VI palsy  Nystagmus: none     CN V   Facial sensation intact.     CN VII   Facial expression full, symmetric.     CN VIII   CN VIII normal.     CN IX, X   CN IX normal.   CN X normal.     CN XI   CN XI normal.     CN XII "   CN XII normal.     Motor Exam   Muscle bulk: normal  Overall muscle tone: normal    Strength   Right biceps: 5/5  Left biceps: 5/5  Right triceps: 5/5  Left triceps: 5/5  Right quadriceps: 5/5  Left quadriceps: 5/5  Right hamstrin/5  Left hamstrin/5    Sensory Exam   Light touch normal.   Proprioception normal.     Gait, Coordination, and Reflexes     Coordination   Finger to nose coordination: normal    Tremor   Resting tremor: absent  Intention tremor: present (Medium velocity, medium frequency tremor to left hand upon reaching intention target; low velocity, low frequency tremor to bilateral hands with outstretched arms, left worse than right)  Action tremor: absent    Reflexes   Reflexes 2+ except as noted. Able to go from sitting to standing position on first attempt without assistance, imbalance noted, able to complete a turn in about 2 steps. Mild axial tremor noted.         Assessment / Plan      Assessment/Plan:   Diagnoses and all orders for this visit:    1. Parkinsonian features (Primary)  -     Ambulatory Referral to Neurology    2. Abnormal involuntary movement  -     Ambulatory Referral to Neurology    3. Dizziness    4. Imbalance    5. Difficulty with speech    6. Idiopathic peripheral neuropathy    7. Benign head tremor    8. Tremor of both hands  -     primidone (MYSOLINE) 50 MG tablet; Take 1 tablet by mouth Every Night for 7 days, THEN 2 tablets Every Night for 21 days.  Dispense: 49 tablet; Refill: 0    9. BMI 30.0-30.9,adult    *She does not have attributes of classic Parkinson's disease. She has some type of abnormal movement disorder; essential tremor? Possibly. Peripheral neuropathy will also contribute to imbalance/unsteadiness.   *Patient is agreeable to referral to movement disorder specialist at - I have advised that clinic is booked out quite far.   *Indications and possible SEs of Primidone discussed. She will continue Sinemet and Pramipexole for now.   *Fall precautions  discussed. Advised patient to use her cane for ambulation to help prevent falls.     Follow Up:   Return in about 1 month (around 9/11/2022) for Follow Up.    GEOVANNY Back, FNP-C  [facility]       Please note that portions of this note may have been completed with a voice recognition program. Efforts were made to edit the dictations, but occasionally words are mistranscribed.

## 2022-08-12 ENCOUNTER — TELEPHONE (OUTPATIENT)
Dept: NEUROLOGY | Facility: CLINIC | Age: 77
End: 2022-08-12

## 2022-08-12 DIAGNOSIS — R25.1 TREMOR OF BOTH HANDS: ICD-10-CM

## 2022-08-12 RX ORDER — PRIMIDONE 50 MG/1
TABLET ORAL
Qty: 49 TABLET | Refills: 0 | Status: CANCELLED | OUTPATIENT
Start: 2022-08-12 | End: 2022-09-09

## 2022-08-12 NOTE — TELEPHONE ENCOUNTER
Spoke with pt and she stated she felt drugged from the time she woke up until around 3 in the afternoon, I let her know Margaret would be out of the office until Monday and if she had another spell over the weekend I would advise her to be evaluated in the ER

## 2022-08-12 NOTE — TELEPHONE ENCOUNTER
Caller: Padmini Leonard    Relationship: Self    Best call back number: [phone]    What medications are you currently taking:   Current Outpatient Medications on File Prior to Visit   Medication Sig Dispense Refill   • alendronate (FOSAMAX) 70 MG tablet Take 70 mg by mouth Every 7 (Seven) Days.     • amLODIPine (NORVASC) 2.5 MG tablet Take 1 tablet by mouth Daily.     • atorvastatin (LIPITOR) 80 MG tablet Take 80 mg by mouth Daily.     • busPIRone (BUSPAR) 10 MG tablet Take 10 mg by mouth 3 (Three) Times a Day.     • carbidopa-levodopa (SINEMET)  MG per tablet Take 1 tablet by mouth 3 (Three) Times a Day.     • citalopram (CeleXA) 40 MG tablet Take 1 tablet by mouth Daily.     • EPINEPHrine (EPIPEN) 0.3 MG/0.3ML solution auto-injector injection      • ferrous sulfate 325 (65 FE) MG tablet Take 325 mg by mouth Every 14 (Fourteen) Days.     • fluticasone (FLONASE) 50 MCG/ACT nasal spray 1 spray into the nostril(s) as directed by provider As Needed.     • gabapentin (NEURONTIN) 300 MG capsule Take 300 mg by mouth 3 (Three) Times a Day.     • hydroxychloroquine (PLAQUENIL) 200 MG tablet Take 200 mg by mouth 2 (Two) Times a Day.     • levothyroxine (SYNTHROID, LEVOTHROID) 50 MCG tablet Take 50 mcg by mouth Daily.     • loratadine-pseudoephedrine (CLARITIN-D 24-hour)  MG per 24 hr tablet Take 1 tablet every day by oral route.     • pantoprazole (PROTONIX) 40 MG EC tablet TAKE 1 TABLET BY MOUTH DAILY IN THE MORNING 30 MINUTES BEFORE BREAKFAST. 90 tablet 1   • pramipexole (MIRAPEX) 0.5 MG tablet TAKE 1 TABLET TWICE DAILY 180 tablet 1   • primidone (MYSOLINE) 50 MG tablet Take 1 tablet by mouth Every Night for 7 days, THEN 2 tablets Every Night for 21 days. 49 tablet 0   • ramipril (ALTACE) 5 MG capsule Take 5 mg by mouth Daily.     • Tiotropium Bromide Monohydrate (Spiriva Respimat) 1.25 MCG/ACT aerosol solution inhaler Inhale 2 puffs every day by inhalation route.     • TiZANidine (ZANAFLEX) 4 MG  capsule Take 4 mg by mouth Daily.     • vitamin D (ERGOCALCIFEROL) 1.25 MG (44645 UT) capsule capsule Take 50,000 Units by mouth Every 14 (Fourteen) Days.       No current facility-administered medications on file prior to visit.          When did you start taking these medications: LAST NIGHT 8-11-22)    Which medication are you concerned about: PRIMIDONE    Who prescribed you this medication: GEOVANNY DUGGAN    What are your concerns: PT STATES FEELS LIKED SHE IS DRUGGED.  STATES SHE CAN'T KEEP HER BALANCE DURING THE DAY.      How long have you had these concerns: TODAY (8-12-22)           Xerosis Aggressive Treatment: I recommended application of Cetaphil or CeraVe numerous times a day going to bed to all dry areas. I also prescribed a topical steroid for twice daily use.

## 2022-08-22 ENCOUNTER — TELEPHONE (OUTPATIENT)
Dept: NEUROLOGY | Facility: CLINIC | Age: 77
End: 2022-08-22

## 2022-08-22 NOTE — TELEPHONE ENCOUNTER
See previous message from Mckayla.       Spoke with patient today stated she stopped taking the medication.

## 2022-08-22 NOTE — TELEPHONE ENCOUNTER
So, she did the sleep study with her supplemental oxygen in place? If so, she will need to discuss further with her PCP and see what she wants to do about her continued decreased oxygen level.

## 2022-08-22 NOTE — TELEPHONE ENCOUNTER
----- Message from GEOVANNY Tovar sent at 8/22/2022  1:20 PM EDT -----  Can you please let her know her sleep study did not show evidence of significant VENESSA with an AHI of 4.3/hour but it did show a fairly significant low oxygen level at times. Please advise her to consult with her PCP as she will need an overnight pulse oximetry test ordered to see if she qualifies for night time supplemental oxygen. Thanks.

## 2022-08-22 NOTE — TELEPHONE ENCOUNTER
Called patient back she stated she did not have her oxygen on during the study but her PCP has ordered an overnight oximetry test. She just hasn't completed that yet.

## 2022-09-14 DIAGNOSIS — R25.1 TREMOR OF BOTH HANDS: ICD-10-CM

## 2022-09-15 RX ORDER — PRIMIDONE 50 MG/1
TABLET ORAL
Qty: 49 TABLET | Refills: 0 | OUTPATIENT
Start: 2022-09-15

## 2022-09-15 NOTE — TELEPHONE ENCOUNTER
"This medication was discontinued because the patient said it made her feel \"drugged\". She should not be taking it so a refill is not appropriate.   "

## 2022-09-20 ENCOUNTER — APPOINTMENT (OUTPATIENT)
Dept: CT IMAGING | Facility: HOSPITAL | Age: 77
End: 2022-09-20

## 2022-09-20 ENCOUNTER — HOSPITAL ENCOUNTER (EMERGENCY)
Facility: HOSPITAL | Age: 77
Discharge: HOME | End: 2022-09-20
Attending: EMERGENCY MEDICINE | Admitting: EMERGENCY MEDICINE

## 2022-09-20 VITALS
RESPIRATION RATE: 20 BRPM | HEIGHT: 64 IN | OXYGEN SATURATION: 92 % | BODY MASS INDEX: 29.53 KG/M2 | TEMPERATURE: 98.6 F | SYSTOLIC BLOOD PRESSURE: 165 MMHG | WEIGHT: 173 LBS | HEART RATE: 93 BPM | DIASTOLIC BLOOD PRESSURE: 95 MMHG

## 2022-09-20 DIAGNOSIS — M54.2 NECK PAIN: ICD-10-CM

## 2022-09-20 DIAGNOSIS — S09.90XA INJURY OF HEAD, INITIAL ENCOUNTER: Primary | ICD-10-CM

## 2022-09-20 PROCEDURE — 72125 CT NECK SPINE W/O DYE: CPT

## 2022-09-20 PROCEDURE — 70450 CT HEAD/BRAIN W/O DYE: CPT

## 2022-09-20 PROCEDURE — 70486 CT MAXILLOFACIAL W/O DYE: CPT

## 2022-09-20 PROCEDURE — 99283 EMERGENCY DEPT VISIT LOW MDM: CPT

## 2022-09-21 NOTE — ED PROVIDER NOTES
Subjective   History of Present Illness  77-year-old female presents to the ED today for complaint of trying to climb over a dog gate and falling backward onto her concrete floor.  She hit the back of her head.  She now has a headache, jaw pain and neck pain.  She did not lose consciousness.  She has no nausea or vomiting.  No other pain noted.  Denies chest pain or shortness of breath.  No hip or back pain.  No other injuries.  Her daughter is with her tonight.        Review of Systems   Constitutional: Negative.    Eyes: Negative.    Respiratory: Negative.    Cardiovascular: Negative.    Gastrointestinal: Negative.    Endocrine: Negative.    Genitourinary: Negative.    Musculoskeletal: Positive for neck pain.   Skin: Negative.    Allergic/Immunologic: Negative.    Neurological: Positive for headaches.   Hematological: Negative.    Psychiatric/Behavioral: Negative.        Past Medical History:   Diagnosis Date   • Anxiety    • Arthritis    • Asthma    • Backache     H/o   • Bell palsy    • Bell's palsy    • Blood disorder    • Cataracts, bilateral    • CHF (congestive heart failure) ([facility])    • COPD (chronic obstructive pulmonary disease) ([facility])     emphysema   • Disease of thyroid gland    • Diverticulosis 07/30/2021   • Elevated cholesterol    • Fibromyalgia    • Fractures    • Gait abnormality    • GERD (gastroesophageal reflux disease)    • H/O renal calculi    • History of blood transfusion    • Hx of echocardiogram     Ilinoise   • Hyperlipidemia    • Hypertension    • Ischemic ulcer of left foot (HCC)     · Ischemic left foot. 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 Unfortunately  the patient continues to smoke. Long discussion was undertaken with the patient. She had accepteda request ti give a serious consideration to quit smoking   • Lupus (HCC)    • Lupus (HCC)    • Neuropathy 07/30/2021    bilateral LLE worse on left   • Peptic ulcer     H/o   • Peripheral vascular disease (HCC)    •  Piercing 07/30/2021    ears only   • Shingles    • Tattoo     x1   • Wears partial dentures     upper       Allergies   Allergen Reactions   • Penicillins Rash   • Sulfa Antibiotics Nausea And Vomiting       Past Surgical History:   Procedure Laterality Date   • APPENDECTOMY     • COLONOSCOPY      screening   • COLONOSCOPY N/A 9/1/2021    Procedure: COLONOSCOPY WITH BIOPSY AND POLYPECTOMY;  Surgeon: Kathryn Stewart MD;  Location: [facility] ENDOSCOPY;  Service: Gastroenterology;  Laterality: N/A;   • ENDOSCOPY      with biopsy   • ENDOSCOPY N/A 8/2/2021    Procedure: ESOPHAGOGASTRODUODENOSCOPY WITH BIOPSIES AND CAUTERIZATION OF AVM AND PLACEMENT OF CLIP;  Surgeon: Kathryn Stewart MD;  Location: [facility] ENDOSCOPY;  Service: Gastroenterology;  Laterality: N/A;   • EXCISION Left     gangreen left foot   • EYE SURGERY      Cataract   • LAPAROSCOPIC TUBAL LIGATION     • PERIPHERAL ARTERIAL STENT GRAFT Bilateral 07/30/2021       Family History   Problem Relation Age of Onset   • Lung cancer Father    • Alcohol abuse Father    • Cancer Father    • Liver cancer Sister    • Dementia Mother    • Colon cancer Neg Hx        Social History     Socioeconomic History   • Marital status:    Tobacco Use   • Smoking status: Current Every Day Smoker     Packs/day: 1.50     Years: 60.00     Pack years: 90.00   • Smokeless tobacco: Never Used   Vaping Use   • Vaping Use: Never used   Substance and Sexual Activity   • Alcohol use: No   • Drug use: No   • Sexual activity: Defer           Objective   Physical Exam  Vitals and nursing note reviewed. Exam conducted with a chaperone present.   Constitutional:       Appearance: Normal appearance.   HENT:      Head: Normocephalic.      Comments: Knot on back of head     Nose: Nose normal.      Mouth/Throat:      Mouth: Mucous membranes are moist.      Pharynx: Oropharynx is clear.   Eyes:      Extraocular Movements: Extraocular movements intact.      Conjunctiva/sclera:  Conjunctivae normal.      Pupils: Pupils are equal, round, and reactive to light.   Cardiovascular:      Rate and Rhythm: Normal rate and regular rhythm.      Pulses: Normal pulses.      Heart sounds: Normal heart sounds.   Pulmonary:      Effort: Pulmonary effort is normal.      Breath sounds: Normal breath sounds.   Abdominal:      General: Bowel sounds are normal.      Palpations: Abdomen is soft.   Musculoskeletal:         General: Normal range of motion.      Cervical back: Tenderness present.   Skin:     General: Skin is warm and dry.      Capillary Refill: Capillary refill takes less than 2 seconds.   Neurological:      Mental Status: She is alert and oriented to person, place, and time. Mental status is at baseline.   Psychiatric:         Mood and Affect: Mood normal.         Behavior: Behavior normal.         Procedures           ED Course  ED Course as of 09/20/22 2300   Tue Sep 20, 2022   2259 Discussed all scans with patient and daughter. Patient is ready for dc [JK]      ED Course User Index  [JK] Mishel Serrano, GEOVANNY                                           [facility]    Final diagnoses:   Injury of head, initial encounter   Neck pain       ED Disposition  ED Disposition     ED Disposition   Discharge    Condition   Stable    Comment   --             Sarah Frost,   [address] [facility] [place] [address]  [phone]    Schedule an appointment as soon as possible for a visit   If symptoms worsen         Medication List      No changes were made to your prescriptions during this visit.          Mishel Serrano APRN  09/20/22 2241       Mishel Serrano APRN  09/20/22 2301

## 2022-09-22 ENCOUNTER — HOSPITAL ENCOUNTER (OUTPATIENT)
Dept: MAMMOGRAPHY | Facility: HOSPITAL | Age: 77
Discharge: HOME | End: 2022-09-22
Admitting: FAMILY MEDICINE

## 2022-09-22 ENCOUNTER — OFFICE VISIT (OUTPATIENT)
Dept: NEUROLOGY | Facility: CLINIC | Age: 77
End: 2022-09-22

## 2022-09-22 VITALS
SYSTOLIC BLOOD PRESSURE: 140 MMHG | BODY MASS INDEX: 30.05 KG/M2 | DIASTOLIC BLOOD PRESSURE: 72 MMHG | TEMPERATURE: 96.4 F | OXYGEN SATURATION: 94 % | WEIGHT: 176 LBS | HEART RATE: 87 BPM | HEIGHT: 64 IN

## 2022-09-22 DIAGNOSIS — R25.1 TREMOR OF BOTH HANDS: Primary | ICD-10-CM

## 2022-09-22 DIAGNOSIS — R26.89 IMBALANCE: ICD-10-CM

## 2022-09-22 DIAGNOSIS — R25.9 ABNORMAL INVOLUNTARY MOVEMENT: ICD-10-CM

## 2022-09-22 DIAGNOSIS — G60.9 IDIOPATHIC PERIPHERAL NEUROPATHY: ICD-10-CM

## 2022-09-22 DIAGNOSIS — G25.0 BENIGN HEAD TREMOR: ICD-10-CM

## 2022-09-22 DIAGNOSIS — Z12.31 ENCOUNTER FOR SCREENING MAMMOGRAM FOR MALIGNANT NEOPLASM OF BREAST: ICD-10-CM

## 2022-09-22 DIAGNOSIS — G25.81 RESTLESS LEGS SYNDROME (RLS): ICD-10-CM

## 2022-09-22 DIAGNOSIS — R25.9 PARKINSONIAN FEATURES: ICD-10-CM

## 2022-09-22 PROCEDURE — 77063 BREAST TOMOSYNTHESIS BI: CPT

## 2022-09-22 PROCEDURE — 77067 SCR MAMMO BI INCL CAD: CPT

## 2022-09-22 PROCEDURE — 99214 OFFICE O/P EST MOD 30 MIN: CPT | Performed by: NURSE PRACTITIONER

## 2022-09-22 RX ORDER — IPRATROPIUM BROMIDE 21 UG/1
SPRAY, METERED NASAL
COMMUNITY
Start: 2022-09-21

## 2022-09-22 RX ORDER — TRAMADOL HYDROCHLORIDE 50 MG/1
TABLET ORAL
COMMUNITY

## 2022-09-22 RX ORDER — TIZANIDINE 4 MG/1
TABLET ORAL
COMMUNITY
Start: 2022-09-17 | End: 2022-12-22 | Stop reason: SDUPTHER

## 2022-09-22 RX ORDER — AMLODIPINE BESYLATE 5 MG/1
TABLET ORAL
COMMUNITY
Start: 2022-09-21

## 2022-09-22 RX ORDER — PANTOPRAZOLE SODIUM 40 MG/1
TABLET, DELAYED RELEASE ORAL
COMMUNITY
End: 2022-09-22 | Stop reason: SDUPTHER

## 2022-09-22 NOTE — PROGRESS NOTES
"     Follow Up Office Visit      Patient Name: Padmini Leonard  : 1945   MRN: [id number]     Chief Complaint:    Chief Complaint   Patient presents with   • Tremors     Patient in office to follow up on parkinson's.        History of Present Illness: Padmini Leonard is a 77 y.o. female who is here today to follow up and was last seen on 2022.  She is accompanied by her daughter again today.  She had a fall (when trying to step over a small gate) and went to the ED because she hit her head- no loss of consciousness.  She is using a walker most of the time.  She is taking Sinemet 25/100mg TID and Pramipexole 0.5mg BID.  Her daughter says her abnormal movements have progressively gotten worse- worse in the mornings.  Her daughter states, \"It's mostly just like sudden jerks, and it keeps her up about all night, I know she isn't sleeping much\".  The patient complains of a \"fog feeling\" to her head.  She denies any history of antipsychotic medication use.  She denies alcohol use.  Additional risk factors- BMI 30, RA, lupus, RLS, fibromyalgia, history of TIA, mood disorder, HTN, dyslipidemia, GERD, cigarette smoker.   *Unable to tolerate small doses of Primidone.  Unable to tolerate Gabapentin previously.   *Appointment with movement disorder center (Dr. Quiroz) in 2023.    *CT head without contrast on 2000 showed Atrophy and chronic changes without acute process. Small posterior scalp hematoma.   *MRI brain without contrast in 2022 was noncontributory.     Following taken from previous visit note:  Padmini Leonard is a 77 y.o. female who is here today to follow up and was last seen on 2021.  She is taking Sinemet 25/100mg TID and Pramipexole 0.5mg BID- reports good compliance and toleration.  She complains of \"brain fog\", abnormal body movements, imbalance, tremors, dizziness, and stuttering.  She has a cane and walker she uses as needed.  She states, \"Sleeping, not good at all\".  She " "wakes up to urinate and then can't go back to sleep due to \"the body moving all over\".  Her daughter says \"she always tends to want to fall forward\".  Her daughter says she had a recent home sleep study done and was told she needed oxygen at night.  She thinks her PCP is going to order that but she hasn't heard from anyone about it.    *Recent ED () visit note reviewed at time of visit.  It appears she presented to the ED with suspected TIA but her work up was negative for acute cardiac or neurological event.  It was suspected her symptoms are the result of a progress of Parkinson's disease.       Subjective      Review of Systems:   Review of Systems   Constitutional: Negative for chills, fatigue and fever.   HENT: Positive for voice change. Negative for facial swelling, hearing loss, sore throat, tinnitus and trouble swallowing.    Eyes: Negative for blurred vision, double vision, photophobia and visual disturbance.   Respiratory: Negative for cough, chest tightness and shortness of breath.    Cardiovascular: Negative for chest pain, palpitations and leg swelling.   Gastrointestinal: Negative for abdominal pain, nausea and vomiting.   Endocrine: Negative for cold intolerance and heat intolerance.   Musculoskeletal: Positive for gait problem. Negative for neck pain and neck stiffness.   Skin: Negative for color change and rash.   Allergic/Immunologic: Negative for environmental allergies and food allergies.   Neurological: Positive for tremors and weakness. Negative for dizziness, syncope, speech difficulty, light-headedness, numbness, headache and memory problem.   Psychiatric/Behavioral: Positive for sleep disturbance. Negative for behavioral problems and depressed mood. The patient is not nervous/anxious.        I have reviewed and the following portions of the patient's history were updated as appropriate: past family history, past medical history, past social history, past surgical history and problem " list.    Medications:     Current Outpatient Medications:   •  alendronate (FOSAMAX) 70 MG tablet, Take 70 mg by mouth Every 7 (Seven) Days., Disp: , Rfl:   •  amLODIPine (NORVASC) 5 MG tablet, , Disp: , Rfl:   •  atorvastatin (LIPITOR) 80 MG tablet, Take 80 mg by mouth Daily., Disp: , Rfl:   •  busPIRone (BUSPAR) 10 MG tablet, Take 10 mg by mouth 3 (Three) Times a Day., Disp: , Rfl:   •  carbidopa-levodopa (SINEMET)  MG per tablet, Take 1 tablet by mouth 3 (Three) Times a Day., Disp: , Rfl:   •  citalopram (CeleXA) 40 MG tablet, Take 1 tablet by mouth Daily., Disp: , Rfl:   •  EPINEPHrine (EPIPEN) 0.3 MG/0.3ML solution auto-injector injection, , Disp: , Rfl:   •  ferrous sulfate 325 (65 FE) MG tablet, Take 325 mg by mouth Every 14 (Fourteen) Days., Disp: , Rfl:   •  fluticasone (FLONASE) 50 MCG/ACT nasal spray, 1 spray into the nostril(s) as directed by provider As Needed., Disp: , Rfl:   •  hydroxychloroquine (PLAQUENIL) 200 MG tablet, Take 200 mg by mouth 2 (Two) Times a Day., Disp: , Rfl:   •  ipratropium (ATROVENT) 0.03 % nasal spray, , Disp: , Rfl:   •  levothyroxine (SYNTHROID, LEVOTHROID) 50 MCG tablet, Take 50 mcg by mouth Daily., Disp: , Rfl:   •  loratadine-pseudoephedrine (CLARITIN-D 24-hour)  MG per 24 hr tablet, Take 1 tablet every day by oral route., Disp: , Rfl:   •  pantoprazole (PROTONIX) 40 MG EC tablet, TAKE 1 TABLET BY MOUTH DAILY IN THE MORNING 30 MINUTES BEFORE BREAKFAST., Disp: 90 tablet, Rfl: 1  •  pramipexole (MIRAPEX) 0.5 MG tablet, TAKE 1 TABLET TWICE DAILY, Disp: 180 tablet, Rfl: 1  •  ramipril (ALTACE) 5 MG capsule, Take 5 mg by mouth Daily., Disp: , Rfl:   •  Tiotropium Bromide Monohydrate (Spiriva Respimat) 1.25 MCG/ACT aerosol solution inhaler, Inhale 2 puffs every day by inhalation route., Disp: , Rfl:   •  TiZANidine (ZANAFLEX) 4 MG capsule, Take 4 mg by mouth Daily., Disp: , Rfl:   •  tiZANidine (ZANAFLEX) 4 MG tablet, , Disp: , Rfl:   •  traMADol (ULTRAM) 50 MG  "tablet, tramadol 50 mg tablet  Take 1 tablet every day by oral route as needed for 30 days., Disp: , Rfl:     Allergies:   Allergies   Allergen Reactions   • Penicillins Rash   • Sulfa Antibiotics Nausea And Vomiting       Objective     Physical Exam:  Vital Signs:   Vitals:    09/22/22 0824   BP: 140/72   BP Location: Right arm   Patient Position: Sitting   Cuff Size: Adult   Pulse: 87   Temp: 96.4 °F (35.8 °C)   SpO2: 94%   Weight: 79.8 kg (176 lb)   Height: 162.6 cm (64\")   PainSc: 0-No pain     Body mass index is 30.21 kg/m².    Physical Exam  Vitals and nursing note reviewed.   Constitutional:       General: She is not in acute distress.     Appearance: Normal appearance. She is well-developed. She is obese. She is not diaphoretic.   HENT:      Head: Normocephalic and atraumatic.   Eyes:      Extraocular Movements: Extraocular movements intact.      Conjunctiva/sclera: Conjunctivae normal.      Pupils: Pupils are equal, round, and reactive to light.   Pulmonary:      Effort: Pulmonary effort is normal. No respiratory distress.   Musculoskeletal:         General: Normal range of motion.   Skin:     General: Skin is warm and dry.      Findings: No rash.   Neurological:      Mental Status: She is alert and oriented to person, place, and time.   Psychiatric:         Mood and Affect: Mood normal.         Behavior: Behavior normal.         Thought Content: Thought content normal.         Judgment: Judgment normal.      Comments: Facial expressions are normal         Neurologic Exam     Mental Status   Oriented to person, place, and time.     Cranial Nerves     CN III, IV, VI   Pupils are equal, round, and reactive to light.    Gait, Coordination, and Reflexes     Tremor   Resting tremor: absent  Intention tremor: absent  Action tremor: absentDyskinetic large, infrequent movement of various parts of the body at times- legs, arms, head/neck. Able to go from sitting to standing position on first attempt with rolling " walker for assistance. No shuffling gait- although gait doesn't appear quite normal. No jerking movements noted during interview and assessment.         Assessment / Plan      Assessment/Plan:   Diagnoses and all orders for this visit:    1. Tremor of both hands (Primary)  -     Ambulatory Referral to Neurology    2. Parkinsonian features  -     Ambulatory Referral to Neurology    3. Abnormal involuntary movement  -     Ambulatory Referral to Neurology    4. Imbalance  -     Ambulatory Referral to Neurology    5. Benign head tremor  -     Ambulatory Referral to Neurology    6. Idiopathic peripheral neuropathy    7. Restless legs syndrome (RLS)    8. BMI 30.0-30.9,adult    *Referral to Dr. Gracia for abnormal movement disorder- appointment with movement disorder center at  isn't until March 2023. I am unsure of how to best proceed from here as the patient doesn't display all the classic symptoms of Parkinson's disease, but does have some abnormal movements.  The abnormal movements have changed some since her last visit with me.   *Fall precautions discussed.      Follow Up:   Return if symptoms worsen or fail to improve.    GEOVANNY Back, FNP-C  [facility]       Please note that portions of this note may have been completed with a voice recognition program. Efforts were made to edit the dictations, but occasionally words are mistranscribed.

## 2022-12-22 ENCOUNTER — OFFICE VISIT (OUTPATIENT)
Dept: NEUROLOGY | Facility: CLINIC | Age: 77
End: 2022-12-22

## 2022-12-22 VITALS
HEART RATE: 73 BPM | HEIGHT: 64 IN | WEIGHT: 179 LBS | OXYGEN SATURATION: 98 % | RESPIRATION RATE: 15 BRPM | SYSTOLIC BLOOD PRESSURE: 138 MMHG | BODY MASS INDEX: 30.56 KG/M2 | DIASTOLIC BLOOD PRESSURE: 70 MMHG | TEMPERATURE: 97.3 F

## 2022-12-22 DIAGNOSIS — G25.81 RESTLESS LEGS SYNDROME (RLS): ICD-10-CM

## 2022-12-22 DIAGNOSIS — G60.9 IDIOPATHIC PERIPHERAL NEUROPATHY: Primary | Chronic | ICD-10-CM

## 2022-12-22 PROCEDURE — 99214 OFFICE O/P EST MOD 30 MIN: CPT | Performed by: PSYCHIATRY & NEUROLOGY

## 2022-12-22 RX ORDER — LEVOCETIRIZINE DIHYDROCHLORIDE 5 MG/1
TABLET, FILM COATED ORAL
COMMUNITY
Start: 2022-12-20

## 2022-12-22 RX ORDER — PRAMIPEXOLE DIHYDROCHLORIDE 0.5 MG/1
.5-1 TABLET ORAL 3 TIMES DAILY
Qty: 540 TABLET | Refills: 3 | Status: SHIPPED | OUTPATIENT
Start: 2022-12-22 | End: 2023-12-22

## 2022-12-22 RX ORDER — CLOPIDOGREL BISULFATE 75 MG/1
TABLET ORAL
COMMUNITY
Start: 2022-11-04

## 2022-12-22 RX ORDER — CEPHALEXIN 500 MG/1
500 CAPSULE ORAL 2 TIMES DAILY
COMMUNITY
Start: 2022-10-19 | End: 2023-03-16

## 2022-12-22 RX ORDER — TRAZODONE HYDROCHLORIDE 50 MG/1
TABLET ORAL
COMMUNITY

## 2022-12-22 NOTE — PROGRESS NOTES
"Chief Complaint  Tremors (BUE (hands) /Head tremors), Numbness (Left foot), and Gait Problem    Subjective        Padmini Leonard presents to [facility]     History of Present Illness    77 y.o. female returns in follow up.  Last visit on 22 continued Sinemet, Mirapex.    Severe sensation of restless.  Sx provoked by sitting or lying.      Worsened by cold or emotions.      Movement improves sx.     Gait unsteady.      Previous medications:  GBP, Primidone.      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    Objective   Vital Signs:  /70 (BP Location: Right arm, Patient Position: Sitting, Cuff Size: Adult)   Pulse 73   Temp 97.3 °F (36.3 °C) (Infrared)   Resp 15   Ht 162.6 cm (64.02\")   Wt 81.2 kg (179 lb)   SpO2 98%   BMI 30.71 kg/m²   Estimated body mass index is 30.71 kg/m² as calculated from the following:    Height as of this encounter: 162.6 cm (64.02\").    Weight as of this encounter: 81.2 kg (179 lb).          Physical Exam  Eyes:      Extraocular Movements: EOM normal.      Pupils: Pupils are equal, round, and reactive to light.   Neurological:      Mental Status: She is oriented to person, place, and time.      Cranial Nerves: Cranial nerves 2-12 are intact.      Motor: Motor strength is normal.      Gait: Gait is intact.      Deep Tendon Reflexes:      Reflex Scores:       Tricep reflexes are 1+ on the right side and 1+ on the left side.       Bicep reflexes are 1+ on the right side and 1+ on the left side.       Brachioradialis reflexes are 1+ on the right side and 1+ on the left side.       Patellar reflexes are 0 on the right side and 0 on the left side.       Achilles reflexes are 0 on the right side and 0 on the left side.  Psychiatric:         Speech: Speech " normal.          Neurologic Exam     Mental Status   Oriented to person, place, and time.   Speech: speech is normal   Level of consciousness: alert  Knowledge: good and consistent with education.   Normal comprehension.     Cranial Nerves   Cranial nerves II through XII intact.     CN II   Visual fields full to confrontation.   Visual acuity: normal  Right visual field deficit: none  Left visual field deficit: none     CN III, IV, VI   Pupils are equal, round, and reactive to light.  Extraocular motions are normal.   Nystagmus: none   Diplopia: none  Ophthalmoparesis: none  Upgaze: normal  Downgaze: normal  Conjugate gaze: present    CN V   Facial sensation intact.   Right corneal reflex: normal  Left corneal reflex: normal    CN VII   Right facial weakness: none  Left facial weakness: none    CN VIII   Hearing: intact    CN IX, X   Palate: symmetric  Right gag reflex: normal  Left gag reflex: normal    CN XI   Right sternocleidomastoid strength: normal  Left sternocleidomastoid strength: normal    CN XII   Tongue: not atrophic  Fasciculations: absent  Tongue deviation: none    Motor Exam   Muscle bulk: normal  Overall muscle tone: normal    Strength   Strength 5/5 throughout.     Sensory Exam   Right leg light touch: decreased from ankle  Left leg light touch: decreased from ankle  Right leg pinprick: decreased from ankle  Left leg pinprick: decreased from ankle    Gait, Coordination, and Reflexes     Gait  Gait: normal    Tremor   Resting tremor: absent  Intention tremor: absent  Action tremor: absent    Reflexes   Reflexes 2+ except as noted.   Right brachioradialis: 1+  Left brachioradialis: 1+  Right biceps: 1+  Left biceps: 1+  Right triceps: 1+  Left triceps: 1+  Right patellar: 0  Left patellar: 0  Right achilles: 0  Left achilles: 0     Result Review :  The following data was reviewed by: Jhoan Gracia MD on 12/22/2022:  Common labs    Common Labs 6/25/22 6/25/22 6/26/22 6/26/22 6/26/22    1526 1529  0627 0627 0627   Glucose  96  103 (A)    BUN  15  18    Creatinine  0.82  0.87    Sodium  137  140    Potassium  4.6  4.5    Chloride  102  106    Calcium  9.1  8.9    Albumin  4.40  3.50    Total Bilirubin  0.3  0.2    Alkaline Phosphatase  121 (A)  99    AST (SGOT)  18  15    ALT (SGPT)  7  7    WBC 9.01       Hemoglobin 12.6       Hematocrit 37.8       Platelets 252       Total Cholesterol     125   Triglycerides     54   HDL Cholesterol     79 (A)   LDL Cholesterol      34   Hemoglobin A1C   5.60     (A) Abnormal value            Data reviewed: Radiologic studies MRI B/C/L          Assessment and Plan   Diagnoses and all orders for this visit:    1. Idiopathic peripheral neuropathy (Primary)  Assessment & Plan:  Stable       2. Restless legs syndrome (RLS)  Assessment & Plan:  Stop Sinemet    Increase Mirapex 1.0 mg TID     Orders:  -     pramipexole (MIRAPEX) 0.5 MG tablet; Take 1-2 tablets by mouth 3 (Three) Times a Day.  Dispense: 540 tablet; Refill: 3           Follow Up   No follow-ups on file.  Patient was given instructions and counseling regarding her condition or for health maintenance advice. Please see specific information pulled into the AVS if appropriate.

## 2023-01-09 ENCOUNTER — TRANSCRIBE ORDERS (OUTPATIENT)
Dept: GENERAL RADIOLOGY | Facility: HOSPITAL | Age: 78
End: 2023-01-09
Payer: MEDICARE

## 2023-01-09 ENCOUNTER — HOSPITAL ENCOUNTER (OUTPATIENT)
Dept: GENERAL RADIOLOGY | Facility: HOSPITAL | Age: 78
Discharge: HOME | End: 2023-01-09
Admitting: ANESTHESIOLOGY
Payer: MEDICARE

## 2023-01-09 DIAGNOSIS — M47.817 LUMBAR AND SACRAL ARTHRITIS: Primary | ICD-10-CM

## 2023-01-09 DIAGNOSIS — M47.817 LUMBAR AND SACRAL ARTHRITIS: ICD-10-CM

## 2023-01-09 PROCEDURE — 72100 X-RAY EXAM L-S SPINE 2/3 VWS: CPT

## 2023-03-13 ENCOUNTER — TRANSCRIBE ORDERS (OUTPATIENT)
Dept: ADMINISTRATIVE | Facility: HOSPITAL | Age: 78
End: 2023-03-13
Payer: MEDICARE

## 2023-03-13 DIAGNOSIS — M81.0 AGE-RELATED OSTEOPOROSIS WITHOUT CURRENT PATHOLOGICAL FRACTURE: Primary | ICD-10-CM

## 2023-03-16 ENCOUNTER — OFFICE VISIT (OUTPATIENT)
Dept: GASTROENTEROLOGY | Facility: CLINIC | Age: 78
End: 2023-03-16
Payer: MEDICARE

## 2023-03-16 VITALS
SYSTOLIC BLOOD PRESSURE: 118 MMHG | HEART RATE: 67 BPM | OXYGEN SATURATION: 96 % | DIASTOLIC BLOOD PRESSURE: 60 MMHG | WEIGHT: 178 LBS | BODY MASS INDEX: 30.54 KG/M2

## 2023-03-16 DIAGNOSIS — K21.9 GASTROESOPHAGEAL REFLUX DISEASE WITHOUT ESOPHAGITIS: Chronic | ICD-10-CM

## 2023-03-16 DIAGNOSIS — Z86.010 PERSONAL HISTORY OF COLONIC POLYPS: Primary | ICD-10-CM

## 2023-03-16 DIAGNOSIS — D50.0 IRON DEFICIENCY ANEMIA DUE TO CHRONIC BLOOD LOSS: ICD-10-CM

## 2023-03-16 PROCEDURE — 99214 OFFICE O/P EST MOD 30 MIN: CPT | Performed by: NURSE PRACTITIONER

## 2023-03-16 PROCEDURE — 1160F RVW MEDS BY RX/DR IN RCRD: CPT | Performed by: NURSE PRACTITIONER

## 2023-03-16 PROCEDURE — 1159F MED LIST DOCD IN RCRD: CPT | Performed by: NURSE PRACTITIONER

## 2023-03-16 RX ORDER — SODIUM CHLORIDE 9 MG/ML
70 INJECTION, SOLUTION INTRAVENOUS CONTINUOUS PRN
OUTPATIENT
Start: 2023-03-16

## 2023-03-16 RX ORDER — SODIUM PICOSULFATE, MAGNESIUM OXIDE, AND ANHYDROUS CITRIC ACID 10; 3.5; 12 MG/160ML; G/160ML; G/160ML
1 LIQUID ORAL TAKE AS DIRECTED
Qty: 320 ML | Refills: 0 | Status: SHIPPED | OUTPATIENT
Start: 2023-03-16

## 2023-03-16 NOTE — PATIENT INSTRUCTIONS
Antireflux measures: Avoid fried, fatty foods, alcohol, chocolate, coffee, tea,  soft drinks, peppermint and spearmint, spicy foods, tomatoes and tomato based foods, onion based foods, and smoking.   Other antireflux measures include weight reduction if overweight, avoiding tight clothing around the abdomen, elevating the head of the bed 6 inches with blocks under the head board, and don't drink or eat before going to bed and avoid lying down immediately after meals.  Avoid vaping/smoking.    May take over the counter antacids as needed.   Colonoscopy: The indications, technique, alternatives and potential risk and complications were discussed with the patient including but not limited to bleeding, perforations, missing lesions and anesthetic complications. The patient understands and wishes to proceed with the procedure and has given their verbal consent. Written patient education information was given to the patient.   The patient will call if they have further questions before procedure.

## 2023-03-16 NOTE — PROGRESS NOTES
Follow Up Note     Date: 2023   Patient Name: Padmini Leonard  MRN: [id number]  : 1945     Primary Care Provider: Sarah Frost DO     Chief Complaint   Patient presents with   • Follow-up   • Colon Cancer Screening     History of present illness:   3/16/2023  Padmini Leonard is a 77 y.o. female who is here today for follow up for colon cancer screening. She was supposed to have her her colonoscopy for surveillance in 2022. She is not having any reflux at this time. She has not needed to take the PPI. Denies abdominal pain, nausea or vomiting. Denies constipation, diarrhea or changes in bowel habits. She is taking an iron supplement, but only one every 14 days or so. She denies any GI bleeding. She has not had anemia  For over a year as far as she knows.    Interval History:  2021  She has been taking the Pantoprazole daily since her last visit. Denies GI bleeding. Denies abdominal pain, nausea, or change in bowel habits.     2021  Patient has a chronic anemia at least for the past 8 years now.  Her hemoglobin as per record dropped to 6 once in .  Her hemoglobin was 9.3 in May 2021 and improved to 11.6 after iron pills.. Iron studies revealed normal iron and ferritin with iron saturation 72%.  This was done mostly with iron pills.  Her folic acid and the vitamin B12 level was normal in May 2021.  Platelets were normal liver enzymes were normal except borderline elevated alkaline phosphatase.  Patient is currently on Plavix. He has ongoing reflux symptoms and symptoms controlled with omeprazole 40mg po daily. She will get occasional rt lower abdomen.      This patient deny any change in bowel habit, hematochezia or melena.  Weight is stable. Pt denies nausea vomiting or odynophagia. She will have choking at throat with solid and liquids since about a year.  Prior history of EGD over ten yrs and last colonoscopy was in , inflammation found at [facility] , biopsy  from the ascending colon path revealed chronic colitis. Pre was poor, few left colon polyps noted that was nott removed. As per record she had multiple polyps removed including advanced polyp with EMR before thant. She had rt colon AVM which was ablated. No family history of colon cancer or any GI malignancy. No history of any abdominal surgery. Denies alcohol abuse or cigarette smoking.      She has lupus/ RA on plaquanil    Subjective      Past Medical History:   Diagnosis Date   • Anxiety    • Arthritis    • Asthma    • Backache     H/o   • Bell palsy    • Bell's palsy    • Blood disorder    • Cataracts, bilateral    • CHF (congestive heart failure) (HCC)    • COPD (chronic obstructive pulmonary disease) (HCC)     emphysema   • Difficulty walking 2021   • Disease of thyroid gland    • Diverticulosis 07/30/2021   • Elevated cholesterol    • Fibromyalgia    • Fibromyalgia, primary 2020   • Fractures    • Gait abnormality    • GERD (gastroesophageal reflux disease)    • H/O renal calculi    • History of blood transfusion    • Hx of echocardiogram     Ilinoise   • Hyperlipidemia    • Hypertension    • Ischemic ulcer of left foot (HCC)     · Ischemic left foot. 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 Unfortunately  the patient continues to smoke. Long discussion was undertaken with the patient. She had accepteda request ti give a serious consideration to quit smoking   • Lupus (HCC)    • Lupus (HCC)    • Neuropathy 07/30/2021    bilateral LLE worse on left   • Peptic ulcer     H/o   • Peripheral vascular disease (HCC)    • Piercing 07/30/2021    ears only   • Shingles    • Tattoo     x1   • Wears partial dentures     upper     Past Surgical History:   Procedure Laterality Date   • APPENDECTOMY     • COLONOSCOPY      screening   • COLONOSCOPY N/A 09/01/2021    Procedure: COLONOSCOPY WITH BIOPSY AND POLYPECTOMY;  Surgeon: Kathryn Stewart MD;  Location: [facility] ENDOSCOPY;  Service:  Gastroenterology;  Laterality: N/A;   • ENDOSCOPY      with biopsy   • ENDOSCOPY N/A 08/02/2021    Procedure: ESOPHAGOGASTRODUODENOSCOPY WITH BIOPSIES AND CAUTERIZATION OF AVM AND PLACEMENT OF CLIP;  Surgeon: Kathryn Stewart MD;  Location: [facility] ENDOSCOPY;  Service: Gastroenterology;  Laterality: N/A;   • EXCISION Left     gangreen left foot   • EYE SURGERY      Cataract   • LAPAROSCOPIC TUBAL LIGATION     • PERIPHERAL ARTERIAL STENT GRAFT Bilateral 07/30/2021   • VASCULAR SURGERY  2015     Family History   Problem Relation Age of Onset   • Dementia Mother    • Lung cancer Father    • Alcohol abuse Father    • Cancer Father    • Liver cancer Sister    • Breast cancer Paternal Grandmother    • Alcohol abuse Sister    • Alcohol abuse Sister    • Colon cancer Neg Hx      Social History     Socioeconomic History   • Marital status:    Tobacco Use   • Smoking status: Every Day     Packs/day: 1.50     Years: 60.00     Pack years: 90.00     Types: Cigarettes   • Smokeless tobacco: Never   Vaping Use   • Vaping Use: Never used   Substance and Sexual Activity   • Alcohol use: No   • Drug use: No   • Sexual activity: Not Currently       Current Outpatient Medications:   •  alendronate (FOSAMAX) 70 MG tablet, Take 1 tablet by mouth Every 7 (Seven) Days., Disp: , Rfl:   •  amLODIPine (NORVASC) 5 MG tablet, , Disp: , Rfl:   •  atorvastatin (LIPITOR) 80 MG tablet, Take 1 tablet by mouth Daily., Disp: , Rfl:   •  busPIRone (BUSPAR) 10 MG tablet, Take 1 tablet by mouth 3 (Three) Times a Day., Disp: , Rfl:   •  citalopram (CeleXA) 40 MG tablet, Take 1 tablet by mouth Daily., Disp: , Rfl:   •  clopidogrel (PLAVIX) 75 MG tablet, , Disp: , Rfl:   •  EPINEPHrine (EPIPEN) 0.3 MG/0.3ML solution auto-injector injection, , Disp: , Rfl:   •  ferrous sulfate 325 (65 FE) MG tablet, Take 1 tablet by mouth Every 14 (Fourteen) Days., Disp: , Rfl:   •  hydroxychloroquine (PLAQUENIL) 200 MG tablet, Take 1 tablet by mouth 2 (Two)  Times a Day., Disp: , Rfl:   •  ipratropium (ATROVENT) 0.03 % nasal spray, , Disp: , Rfl:   •  levocetirizine (XYZAL) 5 MG tablet, , Disp: , Rfl:   •  levothyroxine (SYNTHROID, LEVOTHROID) 50 MCG tablet, Take 1 tablet by mouth Daily., Disp: , Rfl:   •  pramipexole (MIRAPEX) 0.5 MG tablet, Take 1-2 tablets by mouth 3 (Three) Times a Day., Disp: 540 tablet, Rfl: 3  •  ramipril (ALTACE) 5 MG capsule, Take 1 capsule by mouth Daily., Disp: , Rfl:   •  Tiotropium Bromide Monohydrate (Spiriva Respimat) 1.25 MCG/ACT aerosol solution inhaler, Inhale 2 puffs every day by inhalation route., Disp: , Rfl:   •  TiZANidine (ZANAFLEX) 4 MG capsule, Take 1 capsule by mouth Daily., Disp: , Rfl:   •  traMADol (ULTRAM) 50 MG tablet, tramadol 50 mg tablet  Take 1 tablet every day by oral route as needed for 30 days., Disp: , Rfl:   •  traZODone (DESYREL) 50 MG tablet, trazodone 50 mg tablet  Take 1 tablet at bedtime for sleep., Disp: , Rfl:   •  Sod Picosulfate-Mag Ox-Cit Acd (Clenpiq) 10-3.5-12 MG-GM -GM/160ML solution, Take 160 mL by mouth Take As Directed. Use as directed for colonoscopy prep. ., Disp: 320 mL, Rfl: 0     Allergies   Allergen Reactions   • Penicillins Rash   • Sulfa Antibiotics Nausea And Vomiting     The following portions of the patient's history were reviewed and updated as appropriate: allergies, current medications, past family history, past medical history, past social history, past surgical history and problem list.  Objective     Physical Exam  Vitals and nursing note reviewed.   Constitutional:       General: She is not in acute distress.     Appearance: Normal appearance. She is well-developed.   HENT:      Head: Normocephalic and atraumatic.      Mouth/Throat:      Mouth: Mucous membranes are not pale, not dry and not cyanotic.   Eyes:      General: Lids are normal.   Neck:      Trachea: Trachea normal.   Cardiovascular:      Rate and Rhythm: Normal rate.   Pulmonary:      Effort: Pulmonary effort is  normal. No respiratory distress.      Breath sounds: Normal breath sounds.   Abdominal:      Tenderness: There is no abdominal tenderness.   Skin:     General: Skin is warm and dry.   Neurological:      Mental Status: She is alert and oriented to person, place, and time.   Psychiatric:         Mood and Affect: Mood normal.         Speech: Speech normal.         Behavior: Behavior normal. Behavior is cooperative.       Vitals:    03/16/23 1432   BP: 118/60   Pulse: 67   SpO2: 96%   Weight: 80.7 kg (178 lb)     Body mass index is 30.54 kg/m².     Results Review:   I reviewed the patient's new clinical results.    No visits with results within 90 Day(s) from this visit.   Latest known visit with results is:   Admission on 06/25/2022, Discharged on 06/27/2022   Component Date Value Ref Range Status   • Glucose 06/25/2022 96  65 - 99 mg/dL Final   • BUN 06/25/2022 15  8 - 23 mg/dL Final   • Creatinine 06/25/2022 0.82  0.57 - 1.00 mg/dL Final   • Sodium 06/25/2022 137  136 - 145 mmol/L Final   • Potassium 06/25/2022 4.6  3.5 - 5.2 mmol/L Final   • Chloride 06/25/2022 102  98 - 107 mmol/L Final   • CO2 06/25/2022 22.4  22.0 - 29.0 mmol/L Final   • Calcium 06/25/2022 9.1  8.6 - 10.5 mg/dL Final   • Total Protein 06/25/2022 6.6  6.0 - 8.5 g/dL Final   • Albumin 06/25/2022 4.40  3.50 - 5.20 g/dL Final   • ALT (SGPT) 06/25/2022 7  1 - 33 U/L Final   • AST (SGOT) 06/25/2022 18  1 - 32 U/L Final   • Alkaline Phosphatase 06/25/2022 121 (H)  39 - 117 U/L Final   • Total Bilirubin 06/25/2022 0.3  0.0 - 1.2 mg/dL Final   • Globulin 06/25/2022 2.2  gm/dL Final   • A/G Ratio 06/25/2022 2.0  g/dL Final   • BUN/Creatinine Ratio 06/25/2022 18.3  7.0 - 25.0 Final   • Anion Gap 06/25/2022 12.6  5.0 - 15.0 mmol/L Final   • eGFR 06/25/2022 72.9  >60.0 mL/min/1.73 Final   • WBC 06/25/2022 9.01  3.40 - 10.80 10*3/mm3 Final   • RBC 06/25/2022 3.90  3.77 - 5.28 10*6/mm3 Final   • Hemoglobin 06/25/2022 12.6  12.0 - 15.9 g/dL Final   • Hematocrit  06/25/2022 37.8  34.0 - 46.6 % Final   • MCV 06/25/2022 96.9  79.0 - 97.0 fL Final   • MCH 06/25/2022 32.3  26.6 - 33.0 pg Final   • MCHC 06/25/2022 33.3  31.5 - 35.7 g/dL Final   • RDW 06/25/2022 13.5  12.3 - 15.4 % Final   • RDW-SD 06/25/2022 48.5  37.0 - 54.0 fl Final   • MPV 06/25/2022 9.3  6.0 - 12.0 fL Final   • Platelets 06/25/2022 252  140 - 450 10*3/mm3 Final   • Hemoglobin A1C 06/26/2022 5.60  4.80 - 5.60 % Final   • Total Cholesterol 06/26/2022 125  0 - 200 mg/dL Final   • Triglycerides 06/26/2022 54  0 - 150 mg/dL Final   • HDL Cholesterol 06/26/2022 79 (H)  40 - 60 mg/dL Final   • LDL Cholesterol  06/26/2022 34  0 - 100 mg/dL Final   • VLDL Cholesterol 06/26/2022 12  5 - 40 mg/dL Final   • LDL/HDL Ratio 06/26/2022 0.45   Final   • Glucose 06/26/2022 103 (H)  65 - 99 mg/dL Final   • BUN 06/26/2022 18  8 - 23 mg/dL Final   • Creatinine 06/26/2022 0.87  0.57 - 1.00 mg/dL Final   • Sodium 06/26/2022 140  136 - 145 mmol/L Final   • Potassium 06/26/2022 4.5  3.5 - 5.2 mmol/L Final   • Chloride 06/26/2022 106  98 - 107 mmol/L Final   • CO2 06/26/2022 23.4  22.0 - 29.0 mmol/L Final   • Calcium 06/26/2022 8.9  8.6 - 10.5 mg/dL Final   • Total Protein 06/26/2022 5.6 (L)  6.0 - 8.5 g/dL Final   • Albumin 06/26/2022 3.50  3.50 - 5.20 g/dL Final   • ALT (SGPT) 06/26/2022 7  1 - 33 U/L Final   • AST (SGOT) 06/26/2022 15  1 - 32 U/L Final   • Alkaline Phosphatase 06/26/2022 99  39 - 117 U/L Final   • Total Bilirubin 06/26/2022 0.2  0.0 - 1.2 mg/dL Final   • Globulin 06/26/2022 2.1  gm/dL Final   • A/G Ratio 06/26/2022 1.7  g/dL Final   • BUN/Creatinine Ratio 06/26/2022 20.7  7.0 - 25.0 Final   • Anion Gap 06/26/2022 10.6  5.0 - 15.0 mmol/L Final   • eGFR 06/26/2022 67.9  >60.0 mL/min/1.73 Final    National Kidney Foundation and American Society of Nephrology (ASN) Task Force recommended calculation based on the Chronic Kidney Disease Epidemiology Collaboration (CKD-EPI) equation refit without adjustment for race.   •  TSH 06/26/2022 1.620  0.270 - 4.200 uIU/mL Final   • Folate 06/26/2022 7.70  4.78 - 24.20 ng/mL Final      CT Abdomen Pelvis With Contrast     Result Date: 7/30/2021  1. No evidence of metastatic disease. 2. No bowel wall thickening or inflammatory process 3. Stable left nephrolithiasis. This report was finalized on 7/30/2021 6:04 PM by Jaya Rogers MD.     EGD dated 8/2/2021  - Normal oropharynx.  - Z-line regular, 37 cm from the incisors.  - 2 cm hiatal hernia.  - No gross lesions in esophagus.  - No endoscopic esophageal abnormality to explain patient's dysphagia. Biopsied.  - Non-bleeding erosive gastropathy. Biopsied. This could be NSAID related  - Seven non-bleeding angioectasias in the stomach. Treated with argon plasma coagulation (APC). Clip (MR conditional) was placed.  - Normal duodenal bulb, first portion of the duodenum, second portion of the duodenum and third portion of the duodenum. Biopsied.  - She could bleed from AVM or from severe erosions  - Duodenum biopsy unremarkable.  Antrum and body biopsy with minimal chronic gastritis.  Esophagus biopsy unremarkable.     Colonoscopy dated 9/1/2021  - One 10 to 12 mm polyp in the proximal descending colon, removed with a hot snare. Resected and retrieved.  - Three 4 to 6 mm polyps in the proximal descending colon, removed with a cold snare. Resected and retrieved.  - Seven 4 to 8 mm polyps in the sigmoid colon, removed with a hot snare. Resected and retrieved.  - Multiple 4 to 12 mm polyps in the rectum and at the recto-sigmoid colon, removed with a hot snare. Resected and retrieved.  - Multiple small polyps left in rectum, distal sigmoid appears to be hyperplastic  - No endosocpic signs of colitis or ileitis  - The examined portion of the ileum was normal. Biopsied.  - Biopsies performed in the sigmoid colon, in the descending colon, in the transverse colon and in the ascending colon and cecum due to prior history of colitis.  - Small bowel biopsy  unremarkable.  Random colon biopsies unremarkable.  Descending colon polyp proximal x4 biopsy with fragments of hyperplastic polyps.  Sigmoid colon polyp x7 biopsy with fragments of hyperplastic polyps.  Rectal polyp x8 biopsies with fragments of hyperplastic polyps.    Assessment / Plan      1. Personal history of colonic polyps  Colonoscopy 9/1/2021 with multiple polyps removed, >10.  She was recommended to have colonoscopy in 1 year at that time, but is just now coming in to schedule. There is no family history of colon cancer.  Colonoscopy for surveillance.     - Case Request  - Sod Picosulfate-Mag Ox-Cit Acd (Clenpiq) 10-3.5-12 MG-GM -GM/160ML solution; Take 160 mL by mouth Take As Directed. Use as directed for colonoscopy prep. Dispense: 320 mL; Refill: 0    2. Gastroesophageal reflux disease without esophagitis  Reflux reasonably controlled at this time with dietary measures. She is not taking PPI or H2 blocker as she does not feel she needs it. Denies difficulty swallowing. EGD unremarkable, no Lazcano's.  Anti-reflux measures.   May take over the counter antacids as needed.     3. Iron deficiency anemia due to chronic blood loss  Patient has a history of iron deficiency anemia in the past. Recent labs normal. Patient denies GI bleeding. CTAP unremarkable. EGD with non-bleeding erosive gastropathy. This could be NSAID related. Seven non-bleeding angioectasias in the stomach. Treated with argon plasma coagulation. Clip was placed. Colonoscopy with polyps removed, otherwise unremarkable.   Will monitor for now as she is not anemic.    Patient Instructions   1. Antireflux measures: Avoid fried, fatty foods, alcohol, chocolate, coffee, tea,  soft drinks, peppermint and spearmint, spicy foods, tomatoes and tomato based foods, onion based foods, and smoking.   2. Other antireflux measures include weight reduction if overweight, avoiding tight clothing around the abdomen, elevating the head of the bed 6 inches with  blocks under the head board, and don't drink or eat before going to bed and avoid lying down immediately after meals.  3. Avoid vaping/smoking.    4. May take over the counter antacids as needed.   5. Colonoscopy: The indications, technique, alternatives and potential risk and complications were discussed with the patient including but not limited to bleeding, perforations, missing lesions and anesthetic complications. The patient understands and wishes to proceed with the procedure and has given their verbal consent. Written patient education information was given to the patient.   6. The patient will call if they have further questions before procedure.      Shannon Chavez, APRN  3/16/2023    Please note that portions of this note were completed with a voice recognition program.

## 2023-04-14 ENCOUNTER — APPOINTMENT (OUTPATIENT)
Dept: BONE DENSITY | Facility: HOSPITAL | Age: 78
End: 2023-04-14
Payer: MEDICARE

## 2023-04-14 DIAGNOSIS — M81.0 AGE-RELATED OSTEOPOROSIS WITHOUT CURRENT PATHOLOGICAL FRACTURE: ICD-10-CM

## 2023-04-14 PROCEDURE — 77080 DXA BONE DENSITY AXIAL: CPT

## 2023-05-09 ENCOUNTER — ANESTHESIA EVENT (OUTPATIENT)
Dept: GASTROENTEROLOGY | Facility: HOSPITAL | Age: 78
End: 2023-05-09
Payer: MEDICARE

## 2023-05-09 NOTE — ANESTHESIA PREPROCEDURE EVALUATION
Anesthesia Evaluation     Patient summary reviewed and Nursing notes reviewed   no history of anesthetic complications:  NPO Solid Status: > 8 hours  NPO Liquid Status: > 8 hours           Airway   Mallampati: I  TM distance: >3 FB  Neck ROM: full  no difficulty expected and Possible difficult intubation  Dental - normal exam     Pulmonary    (+) a smoker Current, COPD, asthma,shortness of breath, sleep apnea, decreased breath sounds,   Cardiovascular - normal exam    PT is on anticoagulation therapy    (+) hypertension, CHF , DUARTE, PVD, hyperlipidemia,       Neuro/Psych  (+) numbness,    GI/Hepatic/Renal/Endo    (+)  GERD, PUD,  renal disease stones, thyroid problem hypothyroidism    Musculoskeletal     (+) arthralgias, back pain, chronic pain, myalgias,   Abdominal   (+) obese,    Substance History - negative use     OB/GYN negative ob/gyn ROS         Other   arthritis, blood dyscrasia,                       Anesthesia Plan    ASA 3     MAC     (Risks and benefits discussed including risk of aspiration, recall and dental damage. All patient questions answered.    Will continue with plan of care.)  intravenous induction     Anesthetic plan, risks, benefits, and alternatives have been provided, discussed and informed consent has been obtained with: patient.  Pre-procedure education provided

## 2023-05-10 ENCOUNTER — HOSPITAL ENCOUNTER (OUTPATIENT)
Facility: HOSPITAL | Age: 78
Setting detail: HOSPITAL OUTPATIENT SURGERY
Discharge: HOME | End: 2023-05-10
Attending: INTERNAL MEDICINE | Admitting: INTERNAL MEDICINE
Payer: MEDICARE

## 2023-05-10 ENCOUNTER — ANESTHESIA (OUTPATIENT)
Dept: GASTROENTEROLOGY | Facility: HOSPITAL | Age: 78
End: 2023-05-10
Payer: MEDICARE

## 2023-05-10 VITALS
TEMPERATURE: 98.2 F | BODY MASS INDEX: 30.39 KG/M2 | WEIGHT: 178 LBS | OXYGEN SATURATION: 95 % | HEIGHT: 64 IN | RESPIRATION RATE: 18 BRPM | SYSTOLIC BLOOD PRESSURE: 135 MMHG | HEART RATE: 88 BPM | DIASTOLIC BLOOD PRESSURE: 67 MMHG

## 2023-05-10 DIAGNOSIS — Z86.010 PERSONAL HISTORY OF COLONIC POLYPS: ICD-10-CM

## 2023-05-10 PROCEDURE — 25010000002 PROPOFOL 10 MG/ML EMULSION: Performed by: NURSE ANESTHETIST, CERTIFIED REGISTERED

## 2023-05-10 PROCEDURE — 25010000002 FENTANYL CITRATE (PF) 50 MCG/ML SOLUTION: Performed by: NURSE ANESTHETIST, CERTIFIED REGISTERED

## 2023-05-10 PROCEDURE — 25010000002 MIDAZOLAM PER 1MG: Performed by: NURSE ANESTHETIST, CERTIFIED REGISTERED

## 2023-05-10 PROCEDURE — 45385 COLONOSCOPY W/LESION REMOVAL: CPT | Performed by: INTERNAL MEDICINE

## 2023-05-10 PROCEDURE — 88305 TISSUE EXAM BY PATHOLOGIST: CPT

## 2023-05-10 RX ORDER — FENTANYL CITRATE 50 UG/ML
INJECTION, SOLUTION INTRAMUSCULAR; INTRAVENOUS AS NEEDED
Status: DISCONTINUED | OUTPATIENT
Start: 2023-05-10 | End: 2023-05-10 | Stop reason: SURG

## 2023-05-10 RX ORDER — SODIUM CHLORIDE, SODIUM LACTATE, POTASSIUM CHLORIDE, CALCIUM CHLORIDE 600; 310; 30; 20 MG/100ML; MG/100ML; MG/100ML; MG/100ML
INJECTION, SOLUTION INTRAVENOUS CONTINUOUS PRN
Status: DISCONTINUED | OUTPATIENT
Start: 2023-05-10 | End: 2023-05-10 | Stop reason: SURG

## 2023-05-10 RX ORDER — KETAMINE HCL IN NACL, ISO-OSM 100MG/10ML
SYRINGE (ML) INJECTION AS NEEDED
Status: DISCONTINUED | OUTPATIENT
Start: 2023-05-10 | End: 2023-05-10 | Stop reason: SURG

## 2023-05-10 RX ORDER — SIMETHICONE 20 MG/.3ML
EMULSION ORAL AS NEEDED
Status: DISCONTINUED | OUTPATIENT
Start: 2023-05-10 | End: 2023-05-10 | Stop reason: HOSPADM

## 2023-05-10 RX ORDER — SODIUM CHLORIDE 9 MG/ML
70 INJECTION, SOLUTION INTRAVENOUS CONTINUOUS PRN
Status: DISCONTINUED | OUTPATIENT
Start: 2023-05-10 | End: 2023-05-10 | Stop reason: HOSPADM

## 2023-05-10 RX ORDER — PROPOFOL 10 MG/ML
VIAL (ML) INTRAVENOUS AS NEEDED
Status: DISCONTINUED | OUTPATIENT
Start: 2023-05-10 | End: 2023-05-10 | Stop reason: SURG

## 2023-05-10 RX ORDER — MIDAZOLAM HYDROCHLORIDE 2 MG/2ML
INJECTION, SOLUTION INTRAMUSCULAR; INTRAVENOUS AS NEEDED
Status: DISCONTINUED | OUTPATIENT
Start: 2023-05-10 | End: 2023-05-10 | Stop reason: SURG

## 2023-05-10 RX ADMIN — FENTANYL CITRATE 50 MCG: 50 INJECTION, SOLUTION INTRAMUSCULAR; INTRAVENOUS at 08:45

## 2023-05-10 RX ADMIN — PROPOFOL 20 MG: 10 INJECTION, EMULSION INTRAVENOUS at 08:52

## 2023-05-10 RX ADMIN — Medication 10 MG: at 08:36

## 2023-05-10 RX ADMIN — PROPOFOL 20 MG: 10 INJECTION, EMULSION INTRAVENOUS at 08:45

## 2023-05-10 RX ADMIN — PROPOFOL 20 MG: 10 INJECTION, EMULSION INTRAVENOUS at 08:43

## 2023-05-10 RX ADMIN — MIDAZOLAM HYDROCHLORIDE 2 MG: 1 INJECTION, SOLUTION INTRAMUSCULAR; INTRAVENOUS at 08:31

## 2023-05-10 RX ADMIN — PROPOFOL 20 MG: 10 INJECTION, EMULSION INTRAVENOUS at 08:41

## 2023-05-10 RX ADMIN — SODIUM CHLORIDE, POTASSIUM CHLORIDE, SODIUM LACTATE AND CALCIUM CHLORIDE: 600; 310; 30; 20 INJECTION, SOLUTION INTRAVENOUS at 08:08

## 2023-05-10 RX ADMIN — PROPOFOL 20 MG: 10 INJECTION, EMULSION INTRAVENOUS at 08:48

## 2023-05-10 RX ADMIN — SODIUM CHLORIDE 70 ML/HR: 9 INJECTION, SOLUTION INTRAVENOUS at 07:42

## 2023-05-10 RX ADMIN — FENTANYL CITRATE 50 MCG: 50 INJECTION, SOLUTION INTRAMUSCULAR; INTRAVENOUS at 08:31

## 2023-05-10 NOTE — H&P
[facility]  HISTORY AND PHYSICAL    Patient Name: Padmini Leonard  : 1945  MRN: [id number]    Chief Complaint:   For surveillance colonoscopy    History Of Presenting Illness:    Personal h/o colon polyps     Past Medical History:   Diagnosis Date   • Anxiety    • Arthritis    • Asthma    • Backache     H/o   • Bell palsy    • Bell's palsy    • Blood disorder    • Cataracts, bilateral    • CHF (congestive heart failure)    • COPD (chronic obstructive pulmonary disease)     emphysema   • Difficulty walking    • Disease of thyroid gland    • Diverticulosis 2021   • Elevated cholesterol    • Fibromyalgia    • Fibromyalgia, primary    • Fractures    • Gait abnormality    • GERD (gastroesophageal reflux disease)    • H/O renal calculi    • History of blood transfusion    • Hx of echocardiogram     Ilinoise   • Hyperlipidemia    • Hypertension    • Ischemic ulcer of left foot     · Ischemic left foot. 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 Unfortunately  the patient continues to smoke. Long discussion was undertaken with the patient. She had accepteda request ti give a serious consideration to quit smoking   • Lupus    • Lupus    • Neuropathy 2021    bilateral LLE worse on left   • Peptic ulcer     H/o   • Peripheral vascular disease    • Piercing 2021    ears only   • Shingles    • Tattoo     x1   • Wears partial dentures     upper       Past Surgical History:   Procedure Laterality Date   • APPENDECTOMY     • COLONOSCOPY      screening   • COLONOSCOPY N/A 2021    Procedure: COLONOSCOPY WITH BIOPSY AND POLYPECTOMY;  Surgeon: Kathryn Stewart MD;  Location: [facility] ENDOSCOPY;  Service: Gastroenterology;  Laterality: N/A;   • ENDOSCOPY      with biopsy   • ENDOSCOPY N/A 2021    Procedure: ESOPHAGOGASTRODUODENOSCOPY WITH BIOPSIES AND CAUTERIZATION OF AVM AND PLACEMENT OF CLIP;  Surgeon: Kathryn Stewart MD;  Location: [facility]  ENDOSCOPY;  Service: Gastroenterology;  Laterality: N/A;   • EXCISION Left     gangreen left foot   • EYE SURGERY      Cataract   • LAPAROSCOPIC TUBAL LIGATION     • PERIPHERAL ARTERIAL STENT GRAFT Bilateral 07/30/2021   • VASCULAR SURGERY  2015       Social History     Socioeconomic History   • Marital status:    Tobacco Use   • Smoking status: Every Day     Packs/day: 1.50     Years: 60.00     Pack years: 90.00     Types: Cigarettes   • Smokeless tobacco: Never   Vaping Use   • Vaping Use: Never used   Substance and Sexual Activity   • Alcohol use: No   • Drug use: No   • Sexual activity: Not Currently       Family History   Problem Relation Age of Onset   • Dementia Mother    • Lung cancer Father    • Alcohol abuse Father    • Cancer Father    • Liver cancer Sister    • Breast cancer Paternal Grandmother    • Alcohol abuse Sister    • Alcohol abuse Sister    • Colon cancer Neg Hx        Prior to Admission Medications:  Medications Prior to Admission   Medication Sig Dispense Refill Last Dose   • atorvastatin (LIPITOR) 80 MG tablet Take 1 tablet by mouth Daily.   5/9/2023 at 0930   • busPIRone (BUSPAR) 10 MG tablet Take 1 tablet by mouth 3 (Three) Times a Day.   5/9/2023 at 2300   • citalopram (CeleXA) 40 MG tablet Take 1 tablet by mouth Daily.   5/9/2023 at 0930   • clopidogrel (PLAVIX) 75 MG tablet    5/5/2023   • ferrous sulfate 325 (65 FE) MG tablet Take 1 tablet by mouth Every 14 (Fourteen) Days.   Past Month   • hydroxychloroquine (PLAQUENIL) 200 MG tablet Take 1 tablet by mouth 2 (Two) Times a Day.   5/9/2023 at 2300   • ipratropium (ATROVENT) 0.03 % nasal spray    5/9/2023 at 0930   • levocetirizine (XYZAL) 5 MG tablet    5/9/2023 at 2300   • levothyroxine (SYNTHROID, LEVOTHROID) 50 MCG tablet Take 1 tablet by mouth Daily.   5/9/2023 at 0930   • ramipril (ALTACE) 5 MG capsule Take 1 capsule by mouth Daily.   5/9/2023 at 0830   • Tiotropium Bromide Monohydrate (Spiriva Respimat) 1.25 MCG/ACT aerosol  solution inhaler Inhale 2 puffs every day by inhalation route.   5/9/2023 at 0930   • TiZANidine (ZANAFLEX) 4 MG capsule Take 1 capsule by mouth Daily.   5/9/2023 at 0930   • traMADol (ULTRAM) 50 MG tablet tramadol 50 mg tablet   Take 1 tablet every day by oral route as needed for 30 days.   Past Week   • traZODone (DESYREL) 50 MG tablet trazodone 50 mg tablet   Take 1 tablet at bedtime for sleep.   5/9/2023 at 2300   • alendronate (FOSAMAX) 70 MG tablet Take 1 tablet by mouth Every 7 (Seven) Days.      • amLODIPine (NORVASC) 5 MG tablet       • EPINEPHrine (EPIPEN) 0.3 MG/0.3ML solution auto-injector injection    Unknown   • pramipexole (MIRAPEX) 0.5 MG tablet Take 1-2 tablets by mouth 3 (Three) Times a Day. 540 tablet 3    • Sod Picosulfate-Mag Ox-Cit Acd (Clenpiq) 10-3.5-12 MG-GM -GM/160ML solution Take 160 mL by mouth Take As Directed. Use as directed for colonoscopy prep. . 320 mL 0 Unknown       Allergies:  Allergies   Allergen Reactions   • Penicillins Rash   • Sulfa Antibiotics Nausea And Vomiting        Vitals: Temp:  [97 °F (36.1 °C)] 97 °F (36.1 °C)  Heart Rate:  [84] 84  Resp:  [18] 18  BP: (136)/(78) 136/78    Review Of Systems:  Constitutional:  Negative for chills, fever, and unexpected weight change.  Respiratory:  Negative for cough, chest tightness, shortness of breath, and wheezing.  Cardiovascular:  Negative for chest pain, palpitations, and leg swelling.  Gastrointestinal:  Negative for abdominal distention, abdominal pain, nausea, vomiting.  Neurological:  Negative for weakness, numbness, and headaches.     Physical Exam:    General Appearance:  Alert, cooperative, in no acute distress.   Lungs:   Clear to auscultation, respirations regular, even and                 unlabored.   Heart:  Regular rhythm and normal rate.   Abdomen:   Normal bowel sounds, no masses, no organomegaly. Soft, nontender, nondistended   Neurologic: Alert and oriented x 3. Moves all four limbs equally       Assessment &  Plan     Assessment:  Principal Problem:    Personal history of colonic polyps      Plan: COLONOSCOPY (N/A)     Kathryn Stewart MD  5/10/2023

## 2023-05-10 NOTE — DISCHARGE INSTRUCTIONS
- Discharge patient to home (ambulatory).   - High fiber diet.   - Hold plavix for 72 hours  - Continue present medications.   - Await pathology results.   - Possible repeat colonoscopy in 5-7 years for surveillance depend on path and general health of the patient.   - Return to GI office in 8 weeks.

## 2023-05-10 NOTE — ANESTHESIA POSTPROCEDURE EVALUATION
Patient: Padmini Leonard    Procedure Summary     Date: 05/10/23 Room / Location: [facility] ENDOSCOPY 1 / [facility]OSCOPY    Anesthesia Start: 0829 Anesthesia Stop:     Procedure: COLONOSCOPY with polypectomy (Anus) Diagnosis:       Personal history of colonic polyps      (Personal history of colonic polyps [Z86.010])    Surgeons: Kathryn Stewart MD Provider: Yusuf Del Rio CRNA    Anesthesia Type: MAC ASA Status: 3          Anesthesia Type: MAC    Vitals  No vitals data found for the desired time range.          Post Anesthesia Care and Evaluation    Patient location during evaluation: PHASE II  Patient participation: complete - patient participated  Level of consciousness: awake  Pain score: 0  Pain management: adequate    Airway patency: patent  Anesthetic complications: No anesthetic complications  PONV Status: none  Cardiovascular status: acceptable  Respiratory status: acceptable and nasal cannula  Hydration status: acceptable    Comments: See R.N. note for postop vital signs.vsss resp spont, reflexes intact, responsive, report given to pacu nurse

## 2023-05-12 LAB — REF LAB TEST METHOD: NORMAL

## 2024-05-03 ENCOUNTER — TRANSCRIBE ORDERS (OUTPATIENT)
Dept: ADMINISTRATIVE | Facility: HOSPITAL | Age: 79
End: 2024-05-03
Payer: MEDICARE

## 2024-05-03 DIAGNOSIS — M81.0 OSTEOPOROSIS OF MULTIPLE SITES: Primary | ICD-10-CM

## 2025-04-10 ENCOUNTER — APPOINTMENT (OUTPATIENT)
Dept: BONE DENSITY | Facility: HOSPITAL | Age: 80
End: 2025-04-10
Payer: MEDICARE

## 2025-04-10 DIAGNOSIS — M81.0 OSTEOPOROSIS OF MULTIPLE SITES: ICD-10-CM

## 2025-04-10 PROCEDURE — 77080 DXA BONE DENSITY AXIAL: CPT

## (undated) DEVICE — Device

## (undated) DEVICE — HYBRID TUBING/CAP SET FOR OLYMPUS® SCOPES: Brand: ERBE

## (undated) DEVICE — ENDOSCOPY PORT CONNECTOR FOR OLYMPUS® SCOPES: Brand: ERBE

## (undated) DEVICE — SINGLE-USE POLYPECTOMY SNARE: Brand: CAPTIVATOR II

## (undated) DEVICE — SUCTION CANISTER, 1500CC, RIGID: Brand: DEROYAL

## (undated) DEVICE — PATIENT RETURN ELECTRODE, SINGLE-USE, CONTACT QUALITY MONITORING, ADULT, WITH 9FT CORD, FOR PATIENTS WEIGING OVER 33LBS. (15KG): Brand: MEGADYNE

## (undated) DEVICE — LUBE JELLY PK/2.75GM STRL BX/144

## (undated) DEVICE — VLV SXN AIR/H2O ORCAPOD3 1P/U STRL

## (undated) DEVICE — FRCP BX RADJAW4 NDL 2.8 240 STD OG

## (undated) DEVICE — FIAPC® PROBE W/ FILTER 2200 C OD 2.3MM/6.9FR; L 2.2M/7.2FT: Brand: ERBE

## (undated) DEVICE — CONMED SCOPE SAVER BITE BLOCK, 20X27 MM: Brand: SCOPE SAVER

## (undated) DEVICE — QUICK CATCH IN-LINE SUCTION POLYP TRAP IS USED FOR SUCTION RETRIEVAL OF ENDOSCOPICALLY REMOVED POLYPS.